# Patient Record
Sex: FEMALE | Race: WHITE | Employment: PART TIME | URBAN - METROPOLITAN AREA
[De-identification: names, ages, dates, MRNs, and addresses within clinical notes are randomized per-mention and may not be internally consistent; named-entity substitution may affect disease eponyms.]

---

## 2020-10-09 ENCOUNTER — HOSPITAL ENCOUNTER (OUTPATIENT)
Dept: ULTRASOUND IMAGING | Age: 50
Discharge: HOME | End: 2020-10-11
Payer: COMMERCIAL

## 2020-10-09 PROCEDURE — 76705 ECHO EXAM OF ABDOMEN: CPT

## 2023-06-21 ENCOUNTER — HOSPITAL ENCOUNTER (OUTPATIENT)
Dept: MRI IMAGING | Age: 53
Discharge: HOME | End: 2023-06-23
Attending: STUDENT IN AN ORGANIZED HEALTH CARE EDUCATION/TRAINING PROGRAM
Payer: COMMERCIAL

## 2023-06-21 DIAGNOSIS — M75.81 TENDINITIS OF RIGHT ROTATOR CUFF: ICD-10-CM

## 2023-06-21 PROCEDURE — 73221 MRI JOINT UPR EXTREM W/O DYE: CPT

## 2023-09-29 PROBLEM — L50.9 URTICARIA: Status: ACTIVE | Noted: 2023-09-29

## 2023-09-29 PROBLEM — M54.12 CERVICAL RADICULOPATHY: Status: ACTIVE | Noted: 2023-09-29

## 2023-09-29 PROBLEM — M75.80 ROTATOR CUFF TENDINITIS: Status: ACTIVE | Noted: 2023-09-29

## 2023-09-29 PROBLEM — M17.9 KNEE OSTEOARTHRITIS: Status: ACTIVE | Noted: 2023-09-29

## 2023-09-29 PROBLEM — T78.3XXA ANGIOEDEMA: Status: ACTIVE | Noted: 2023-09-29

## 2023-09-29 PROBLEM — E55.9 VITAMIN D INSUFFICIENCY: Status: ACTIVE | Noted: 2023-09-29

## 2023-09-29 PROBLEM — T78.1XXA ADVERSE FOOD REACTION: Status: ACTIVE | Noted: 2023-09-29

## 2023-09-29 PROBLEM — H60.60 CHRONIC OTITIS EXTERNA: Status: ACTIVE | Noted: 2023-09-29

## 2023-09-29 RX ORDER — MELOXICAM 15 MG/1
1 TABLET ORAL DAILY PRN
COMMUNITY
Start: 2021-09-28 | End: 2024-03-01 | Stop reason: HOSPADM

## 2023-09-29 RX ORDER — MOMETASONE FUROATE 1 MG/G
CREAM TOPICAL 2 TIMES DAILY
COMMUNITY
Start: 2019-11-25

## 2023-09-29 RX ORDER — ASPIRIN 325 MG
1 TABLET, DELAYED RELEASE (ENTERIC COATED) ORAL
COMMUNITY
Start: 2021-12-16

## 2023-09-29 RX ORDER — CETIRIZINE HYDROCHLORIDE 10 MG/1
2 TABLET ORAL EVERY 12 HOURS PRN
COMMUNITY
Start: 2021-12-10

## 2023-09-29 RX ORDER — NAPROXEN 500 MG/1
500 TABLET ORAL EVERY 12 HOURS PRN
COMMUNITY
End: 2024-03-01 | Stop reason: HOSPADM

## 2023-09-29 RX ORDER — FLUTICASONE PROPIONATE 50 MCG
2 SPRAY, SUSPENSION (ML) NASAL DAILY
COMMUNITY
Start: 2019-11-25

## 2023-10-06 ENCOUNTER — OFFICE VISIT (OUTPATIENT)
Dept: ORTHOPEDIC SURGERY | Facility: CLINIC | Age: 53
End: 2023-10-06
Payer: COMMERCIAL

## 2023-10-06 VITALS — BODY MASS INDEX: 39.87 KG/M2 | WEIGHT: 225 LBS | HEIGHT: 63 IN

## 2023-10-06 DIAGNOSIS — M54.12 CERVICAL RADICULOPATHY: Primary | ICD-10-CM

## 2023-10-06 DIAGNOSIS — M50.321 OTHER CERVICAL DISC DEGENERATION AT C4-C5 LEVEL: Primary | ICD-10-CM

## 2023-10-06 PROCEDURE — 99215 OFFICE O/P EST HI 40 MIN: CPT | Performed by: ORTHOPAEDIC SURGERY

## 2023-10-06 PROCEDURE — 1036F TOBACCO NON-USER: CPT | Performed by: ORTHOPAEDIC SURGERY

## 2023-10-06 NOTE — PROGRESS NOTES
Marla Nash is a 52 y.o. female who presents for Pain of the Neck (Severe DDD c4-5, 5-6, herniated discs at both levels/Seen by GG).    HPI:  52-year-old female here for evaluation of neck pain but mostly right shoulder and right arm pain.  She is having some numbness and tingling down into the right hand.  Not a whole lot going on on the left hand.  She is sent over by Amrik Correia She denies any fever chills nausea vomiting night sweats.  She has no bowel or bladder complaints.    Physical exam:  Well-nourished, well kept.  No lymphangitis or lymphadenopathy in the examined extremities.    Good perfusion to the extremities X 4. Radial and Dorsalis Pedis pulses 2+.  Capillary refill to all four digits brisk.  No distal edema.  Patient can rise from a seated position, can sit from a standing position. Can get up heels and toes.  Examination of the neck reveals no swelling, step off, or point tenderness.  Range of motion with flexion, extension, side bending and rotation is well maintained without crepitance, instability, or exacerbation of pain.  Strength is within normal limits.  Examination of the upper extremities reveals no point tenderness, swelling, or deformity.  Range of motion of the shoulders, elbows, wrists, and fingers are full without crepitance, instability, or exacerbation of pain. Strength is 5/5 throughout.  Except I get a mild positive empty can and Johnson sign on the right.  No redness, abrasions, or lesions on the upper extremities bilaterally.  Gross sensation intact to the extremities X 4.  Deep tendon reflexes 2+ and symmetric bilaterally.  Monroe, clonus, and Babinski were negative.  Straight leg raise negative.  Affect normal.  Alert and oriented X 3.  Coordination normal.    Assessment:  52-year-old female sent over by Amrik, she has got a several month history of mild right-sided neck pain and some right trapezius pain.  She has pain going down into the right arm and shoulder, does not  usually go into the forearm.  She describes numbness and tingling out into her hand.  She saw Dr. Anthony and had a shot in her right shoulder that did not really give her much relief.  She has tried physical therapy without much help.  She has never had epidural injections or surgery on her neck.  I get a little bit of positive shoulder findings with some mild empty can and Johnson on the right.  She comes to us today with x-rays and MRI from Amrik.  He is recommending a two-level ACDF.    Plan:    For complete plan and/or surgical details, please refer to Dr. Koo's portion of this split dictation.      In a face-to-face encounter, I performed a history and physical examination, discussed pertinent diagnostic studies if indicated, and discussed diagnosis and management strategies with both the patient and the midlevel provider.  I reviewed the midlevel's note and agree with the documented findings and plan of care.    Severe right arm radiculopathy that been going on for several months.  Not getting any better.  Dr. Anthonywas wondering if this was more of a neck problem than a shoulder problem.  The pain goes down the right arm to the hand with numbness and tingling in the hand.  There is difficulty lifting and holding things and some clumsiness in the right side.  Not much on the left side.  No history of surgery.  She is failed physical therapy.  She has had no injections.    On exam I do get some impingement symptoms on the right but they are not that severe.  I do get a positive Spurling's back to the right and there is a little weakness of C5 and C6 on the right compared to left.    MRI was reviewed and report was read as well.  The MRI shows at C4-5 there is moderate central and moderate to severe bilateral foraminal stenosis from a broad-based disc bulge.  At C5-6 there is moderate to severe central and severe right and moderate to severe left foraminal stenosis.  X-rays show degenerative changes at  C4-5 and C5-6.    Assessment/plan: Right arm radiculopathy and mild shoulder impingement.  Patient has had an injection to her shoulder which gave her minimal to no relief.  This appears to me to be a much more of a radiculopathy than a shoulder problem.  However, there could be an underlying shoulder component.  I had a long discussion with the patient and explained to them that their options are 1) live with the symptoms and see how they evolve, 2) physical therapy, 3) pain management or 4) surgery.    Patient has failed living with it.  She is failed physical therapy.  She has no interest in injections.  She just wants to proceed with surgery.  All the risks, benefits, and potential complications for operative and nonoperative treatment were discussed at length the patient. The patient understands that surgery is elective.  The patient understands that I do not have to do surgery.  The patient understands that surgery comes with more risks than not doing surgery. Risks of operative intervention include, but are not limited to: Anesthesia complications, excessive blood loss, infection, damaged uninjured structures including, but not limited to: The dural sac, nerve roots, spinal cord, vertebral artery, trachea, esophagus, and carotid artery, blood clots and lack of improvement or worsening of symptoms.  These complications could result in death, permanent disability including paralysis, swallowing or speech difficulty, or need for reoperation.  The patient completely understood these risks and wished to proceed with operative intervention.    Were going to perform a C4-5 C5-6 anterior cervical impression and fusion.  She is not going to get the surgery until next year.  She will work on a weight loss program prior to that.  We gave her the name of a book and a pamphlet today to work on an anti-inflammatory diet.

## 2023-12-08 ENCOUNTER — ANCILLARY PROCEDURE (OUTPATIENT)
Dept: RADIOLOGY | Facility: CLINIC | Age: 53
End: 2023-12-08
Payer: COMMERCIAL

## 2023-12-08 ENCOUNTER — OFFICE VISIT (OUTPATIENT)
Dept: ORTHOPEDIC SURGERY | Facility: CLINIC | Age: 53
End: 2023-12-08
Payer: COMMERCIAL

## 2023-12-08 DIAGNOSIS — S83.92XA SPRAIN OF LEFT KNEE, INITIAL ENCOUNTER: ICD-10-CM

## 2023-12-08 DIAGNOSIS — M25.562 ACUTE PAIN OF LEFT KNEE: ICD-10-CM

## 2023-12-08 DIAGNOSIS — S83.282A ACUTE LATERAL MENISCUS TEAR OF LEFT KNEE, INITIAL ENCOUNTER: ICD-10-CM

## 2023-12-08 PROCEDURE — 73564 X-RAY EXAM KNEE 4 OR MORE: CPT | Mod: LEFT SIDE | Performed by: FAMILY MEDICINE

## 2023-12-08 PROCEDURE — 99214 OFFICE O/P EST MOD 30 MIN: CPT | Performed by: FAMILY MEDICINE

## 2023-12-08 PROCEDURE — 1036F TOBACCO NON-USER: CPT | Performed by: FAMILY MEDICINE

## 2023-12-08 PROCEDURE — 73564 X-RAY EXAM KNEE 4 OR MORE: CPT | Mod: LT

## 2023-12-08 RX ORDER — METHYLPREDNISOLONE 4 MG/1
TABLET ORAL
Qty: 21 TABLET | Refills: 0 | Status: SHIPPED | OUTPATIENT
Start: 2023-12-08 | End: 2024-03-01 | Stop reason: HOSPADM

## 2023-12-08 RX ORDER — TRAMADOL HYDROCHLORIDE 50 MG/1
50 TABLET ORAL EVERY 8 HOURS PRN
Qty: 20 TABLET | Refills: 0 | Status: SHIPPED | OUTPATIENT
Start: 2023-12-08 | End: 2023-12-15

## 2023-12-10 NOTE — PROGRESS NOTES
Acute Injury New Patient Visit    CC:   Chief Complaint   Patient presents with    Left Knee - Pain     Pain x 3 days  X rays  today       HPI: Marla is a 53 y.o.female who presents today with new complaints of pain and discomfort of the posterior lateral aspect of the left knee.  She denies any obvious injury or trauma.  Has a history of known osteoarthritis and is followed up with Dr. Yaniv White and Dr. Checo Anthony III in the past.  She presents here today for further evaluation.  She has limited range of motion decreased strength has been utilizing a hinged knee brace and after 3 days felt that the pain will get better but it has not presents here for repeat evaluation.        Review of Systems   GENERAL: Negative for malaise, significant weight loss, fever  MUSCULOSKELETAL: See HPI  NEURO: Negative for numbness / tingling     Past Medical History  Past Medical History:   Diagnosis Date    Anesthesia of skin 09/28/2021    Numbness and tingling    Disease of digestive system, unspecified 09/28/2021    Digestive problems    Other general symptoms and signs 09/28/2021    Eye, ear, nose, and throat symptom    Personal history of other diseases of the musculoskeletal system and connective tissue 09/28/2021    History of arthritis    Personal history of other endocrine, nutritional and metabolic disease 09/28/2021    History of diabetes mellitus       Medication review  Medication Documentation Review Audit       Reviewed by Marga Joshi CMA (Medical Assistant) on 10/06/23 at 1442      Medication Order Taking? Sig Documenting Provider Last Dose Status   cetirizine (ZyrTEC) 10 mg tablet [id number]  Take 2 tablets (20 mg) by mouth every 12 hours if needed. Historical Provider, MD  Active   cholecalciferol (Vitamin D-3) 1,250 mcg (50,000 unit) capsule [id number]  Take 1 capsule (50,000 Units) by mouth 1 (one) time per week. Historical Provider, MD  Active   fluticasone (Flonase) 50 mcg/actuation nasal spray  [id number]  Administer 2 sprays into each nostril once daily. Historical Provider, MD  Active   meloxicam (Mobic) 15 mg tablet [id number]  Take 1 tablet (15 mg) by mouth once daily as needed. Historical Provider, MD  Active   mometasone (Elocon) 0.1 % cream [id number]  2 times a day. Apply to ear Historical Provider, MD  Active   naproxen (Naprosyn) 500 mg tablet [id number]  Take 1 tablet (500 mg) by mouth every 12 hours if needed for mild pain (1 - 3). Historical Provider, MD  Active                    Allergies  Allergies   Allergen Reactions    Cat Dander Unknown       Social History  Social History     Socioeconomic History    Marital status:      Spouse name: Not on file    Number of children: Not on file    Years of education: Not on file    Highest education level: Not on file   Occupational History    Not on file   Tobacco Use    Smoking status: Never    Smokeless tobacco: Never   Substance and Sexual Activity    Alcohol use: Not on file    Drug use: Never    Sexual activity: Not on file   Other Topics Concern    Not on file   Social History Narrative    Not on file     Social Determinants of Health     Financial Resource Strain: Not on file   Food Insecurity: Not on file   Transportation Needs: Not on file   Physical Activity: Not on file   Stress: Not on file   Social Connections: Not on file   Intimate Partner Violence: Not on file   Housing Stability: Not on file       Surgical History  Past Surgical History:   Procedure Laterality Date    OTHER SURGICAL HISTORY  09/28/2021    Knee arthroscopy       Physical Exam:  GENERAL:  Patient is awake, alert, and oriented to person place and time.  Patient appears well nourished and well kept.  Affect Calm, Not Acutely Distressed.  HEENT:  Normocephalic, Atraumatic, EOMI  CARDIOVASCULAR:  Hemodynamically stable.  RESPIRATORY:  Normal respirations with unlabored breathing.  NEURO: Gross sensation intact to the lower extremities bilaterally.  Extremity: Left knee  exam the affected knee was examined and inspected and was tender to the touch along the medial and lateral aspect with catching, locking or mechanical symptoms. The skin was intact without breakdown or open wound. Old incisions if present were healed. There was a mild Aicha exam seen with some evidence of instability & weakness in the collateral ligaments with varus/valgus stress & laxity in the anterior or posterior planes. There was a negative Lachman´s test, pivot shift test and posterior drawer sign with no foot drop, numbness or tingling. Sensation, reflexes and pulses in the foot and ankle are preserved. There was an effusion. Range of motion showed good straight leg raise with flexion to 75 degrees and extension to 0 degrees. The patient had the ability to bear weight, but with discomfort. The patient´s gait was antalgic secondary to the discomfort.      Diagnostics: X-rays today demonstrate no obvious fracture or dislocation with mild to moderate tricompartment arthritis worse medially.        Procedure: None  Procedures    Assessment:   Problem List Items Addressed This Visit    None  Visit Diagnoses       Acute pain of left knee        Relevant Orders    XR knee left 4+ views    Acute lateral meniscus tear of left knee, initial encounter        Relevant Medications    traMADol (Ultram) 50 mg tablet    methylPREDNISolone (Medrol Dospak) 4 mg tablets    Other Relevant Orders    MR knee left wo IV contrast    Sprain of left knee, initial encounter        Relevant Medications    traMADol (Ultram) 50 mg tablet    methylPREDNISolone (Medrol Dospak) 4 mg tablets    Other Relevant Orders    MR knee left wo IV contrast             Plan: At this time we will offer the patient a pain medication, OARRS was checked and okay she will also be provided with a steroid pack she will continue with a hinged knee brace icing and we will obtain an MRI for further evaluation due to concern for internal derangement of the left  knee.  She will follow-up with Dr. Checo Anthony III going forward.  Orders Placed This Encounter    XR knee left 4+ views    MR knee left wo IV contrast    traMADol (Ultram) 50 mg tablet    methylPREDNISolone (Medrol Dospak) 4 mg tablets      At the conclusion of the visit there were no further questions by the patient/family regarding their plan of care.  Patient was instructed to call or return with any issues, questions, or concerns regarding their injury and/or treatment plan described above.     12/10/23 at 11:15 AM - Cole C Budinsky, MD    Office: [phone]    This note was prepared using voice recognition software.  The details of this note are correct and have been reviewed, and corrected to the best of my ability.  Some grammatical errors may persist related to the Dragon software.

## 2023-12-21 ENCOUNTER — HOSPITAL ENCOUNTER (OUTPATIENT)
Dept: RADIOLOGY | Facility: HOSPITAL | Age: 53
Discharge: HOME | End: 2023-12-21
Payer: COMMERCIAL

## 2023-12-21 DIAGNOSIS — S83.92XA SPRAIN OF LEFT KNEE, INITIAL ENCOUNTER: ICD-10-CM

## 2023-12-21 DIAGNOSIS — S83.282A ACUTE LATERAL MENISCUS TEAR OF LEFT KNEE, INITIAL ENCOUNTER: ICD-10-CM

## 2023-12-21 PROCEDURE — 73721 MRI JNT OF LWR EXTRE W/O DYE: CPT | Mod: LT

## 2023-12-21 PROCEDURE — 73721 MRI JNT OF LWR EXTRE W/O DYE: CPT | Mod: LEFT SIDE | Performed by: RADIOLOGY

## 2023-12-29 ENCOUNTER — OFFICE VISIT (OUTPATIENT)
Dept: ORTHOPEDIC SURGERY | Facility: CLINIC | Age: 53
End: 2023-12-29
Payer: COMMERCIAL

## 2023-12-29 VITALS — BODY MASS INDEX: 39.87 KG/M2 | HEIGHT: 63 IN | WEIGHT: 225 LBS

## 2023-12-29 DIAGNOSIS — S83.282A ACUTE LATERAL MENISCUS TEAR OF LEFT KNEE, INITIAL ENCOUNTER: Primary | ICD-10-CM

## 2023-12-29 PROCEDURE — 99214 OFFICE O/P EST MOD 30 MIN: CPT | Performed by: STUDENT IN AN ORGANIZED HEALTH CARE EDUCATION/TRAINING PROGRAM

## 2023-12-29 PROCEDURE — 1036F TOBACCO NON-USER: CPT | Performed by: STUDENT IN AN ORGANIZED HEALTH CARE EDUCATION/TRAINING PROGRAM

## 2023-12-29 ASSESSMENT — PAIN SCALES - GENERAL: PAINLEVEL_OUTOF10: 3

## 2023-12-29 ASSESSMENT — PAIN - FUNCTIONAL ASSESSMENT: PAIN_FUNCTIONAL_ASSESSMENT: 0-10

## 2023-12-30 RX ORDER — DICLOFENAC SODIUM 10 MG/G
4 GEL TOPICAL 2 TIMES DAILY
Qty: 100 G | Refills: 0 | Status: SHIPPED | OUTPATIENT
Start: 2023-12-30 | End: 2024-03-01 | Stop reason: HOSPADM

## 2023-12-30 NOTE — PROGRESS NOTES
Chief Complaint   Patient presents with    Left Knee - New Patient Visit     Acute lateral meniscus tear   DOI  - on going pain  Mri 12/21/23       HPI  Patient presents today for follow up of left knee MRI results.  Patient has been having ongoing pain to the knee which is quite bothersome.  Endorses clicking catching popping is difficult time keeping her knee straight due to pain and discomfort.  Has already attempted intra-articular injections, activity modifications, bracing with little to no relief.  Did have a diagnostic arthroscopy by my partner Dr. White several years ago.  This did provide her significant relief for several years time.  Presents today for further evaluation and treatment.       Physical exam  General: Alert and oriented to place, person, and time.  No acute distress and breathing comfortably; pleasant and cooperative with the examination.  Extremity:  Left knee: Some crepitus with range of motion of the knee  Skin healthy and intact  No gross swelling or ecchymosis  No significant varus or valgus malalignment  Effusion: Mild     ROM:  Full flexion   Full extension  No pain with internal rotation of the hip  Tenderness to palpation: Medial lateral joint line     No laxity to valgus stress  No laxity to varus stress  Negative Lachman´s test  Negative anterior drawer test  Negative posterior drawer test  Positive Aicha´s test     Neurovascular exam normal distally    Diagnostics:  MR knee left wo IV contrast    Result Date: 12/21/2023  Interpreted By:  Ankur Moreau, STUDY: MR KNEE LEFT WO IV CONTRAST;  12/21/2023 11:09 am   INDICATION: Signs/Symptoms:Pain. Left knee pain posterolaterally. Limited range of motion.   COMPARISON: MRI examination of 09/19/2019   ACCESSION NUMBER(S): [id number]   ORDERING CLINICIAN: COLE BUDINSKY   TECHNIQUE: Multiplanar and multisequential MR images of the left knee are performed.   FINDINGS: There is a small joint effusion. There is a  moderate to large sized Baker's cyst with multiple internal septations.   There are findings of tricompartmental osteoarthritis which are severe in the medial compartment. At the medial compartment there is full-thickness loss of the articular cartilage with subchondral degenerative signal and cystic changes. Large marginal osteophytes are identified at multiple sites. There is peripheral displacement of the medial meniscal body. There is diffuse thinning of the patellar articular cartilage with full-thickness fissuring of the cartilage at the medial and lateral facet as well as the patellar apex. There is mild irregular thinning of the articular cartilage at the medial compartment with mild joint space narrowing. There is no evidence of acute osseous fracture, bone marrow edema, or osseous mass. There is no loose osteochondral lesion.   The lateral meniscus is of normal morphology. There is no linear tear or truncation.   The medial meniscus is abnormal. The posterior horn and body demonstrate contour irregularity more pronounced towards the free edge. There is complex signal throughout the posterior horn and body which extends to the superior and inferior articular surfaces. The anterior horn is unremarkable aside from some blunting of the free edge.   The anterior and posterior cruciate ligaments, lateral collateral ligament complex, popliteus tendon, medial collateral ligament, extensor complex, and patellar retinacula are intact.       Tricompartmental osteoarthritis, severe in the medial compartment. There is superimposed complex tearing of the posterior horn and body of the medial meniscus as detailed above.   Small joint effusion and moderate to large sized Baker's cyst with internal septations.   No sign of acute ligament disruption.   Signed by: Ankur Moreau 12/21/2023 1:54 PM Dictation workstation:   [id number]    XR knee left 4+ views    Result Date: 12/10/2023  Interpreted By:  Budinsky, Cole,  STUDY: XR KNEE LEFT 4+ VIEWS;  ;  12/8/2023 4:14 pm   INDICATION: Signs/Symptoms:pain.   ACCESSION NUMBER(S): [id number]   ORDERING CLINICIAN: COLE BUDINSKY   FINDINGS: Four views left knee demonstrate no presence for obvious acute fracture or dislocation. Presence of mild-to-moderate osteoarthritic changes with medial joint space narrowing and bone spurring. Preserved lateral joint space and subtle lateral patellar tilt. No presence for obvious loose or intra-articular body. Question small suprapatellar joint effusion.     Signed by: Cole Budinsky 12/10/2023 11:14 AM Dictation workstation:   [id number]       Procedures  Procedures     Assessment:  53-year-old female with severe medial compartment knee arthritis, prior history of partial medial meniscectomy    Treatment plan:  Will proceed with prescription for Voltaren cream  Patient has not attempted gel injections and is not interested in any type of surgical intervention at this point time  We will see how she does with gel injection therapy  She does have upcoming surgery with my partner for her  cervical spine  Discussed activities to avoid  Discussed importance of low impact activities and exercises  We discussed the use of nonsteroidal anti-inflammatory drugs as part of a treatment plan. We discussed that these may result in GI upset and/or bleeding. Any stomach pain or dark hard stools would be reason to discontinue use. We discussed that when used over the long-term these medications can result in altered renal or hepatic function, and that routine use beyond 3 months requires follow-up with their primary provider for monitoring.  They expressed their understanding and agree with the plan.    Will submit preop certification for gel injections  All of the patient's questions concerns answered

## 2024-01-05 DIAGNOSIS — Z01.818 PREOP TESTING: Primary | ICD-10-CM

## 2024-02-09 ENCOUNTER — APPOINTMENT (OUTPATIENT)
Dept: ORTHOPEDIC SURGERY | Facility: CLINIC | Age: 54
End: 2024-02-09
Payer: COMMERCIAL

## 2024-02-19 ENCOUNTER — HOSPITAL ENCOUNTER (OUTPATIENT)
Dept: CARDIOLOGY | Facility: HOSPITAL | Age: 54
Discharge: HOME | End: 2024-02-19
Payer: COMMERCIAL

## 2024-02-19 ENCOUNTER — PRE-ADMISSION TESTING (OUTPATIENT)
Dept: PREADMISSION TESTING | Facility: HOSPITAL | Age: 54
End: 2024-02-19
Payer: COMMERCIAL

## 2024-02-19 VITALS
WEIGHT: 235.89 LBS | DIASTOLIC BLOOD PRESSURE: 82 MMHG | SYSTOLIC BLOOD PRESSURE: 138 MMHG | OXYGEN SATURATION: 98 % | HEIGHT: 63 IN | BODY MASS INDEX: 41.8 KG/M2 | HEART RATE: 79 BPM

## 2024-02-19 DIAGNOSIS — Z01.818 PREOP TESTING: Primary | ICD-10-CM

## 2024-02-19 DIAGNOSIS — Z01.818 PREOP TESTING: ICD-10-CM

## 2024-02-19 LAB
ALBUMIN SERPL BCP-MCNC: 4.5 G/DL (ref 3.4–5)
ALP SERPL-CCNC: 82 U/L (ref 33–110)
ALT SERPL W P-5'-P-CCNC: 42 U/L (ref 7–45)
ANION GAP SERPL CALC-SCNC: 10 MMOL/L (ref 10–20)
APPEARANCE UR: CLEAR
AST SERPL W P-5'-P-CCNC: 26 U/L (ref 9–39)
BASOPHILS # BLD AUTO: 0.09 X10*3/UL (ref 0–0.1)
BASOPHILS NFR BLD AUTO: 1.2 %
BILIRUB SERPL-MCNC: 0.5 MG/DL (ref 0–1.2)
BILIRUB UR STRIP.AUTO-MCNC: NEGATIVE MG/DL
BUN SERPL-MCNC: 14 MG/DL (ref 6–23)
CALCIUM SERPL-MCNC: 9.9 MG/DL (ref 8.6–10.3)
CHLORIDE SERPL-SCNC: 102 MMOL/L (ref 98–107)
CO2 SERPL-SCNC: 30 MMOL/L (ref 21–32)
COLOR UR: YELLOW
CREAT SERPL-MCNC: 0.8 MG/DL (ref 0.5–1.05)
EGFRCR SERPLBLD CKD-EPI 2021: 88 ML/MIN/1.73M*2
EOSINOPHIL # BLD AUTO: 0.47 X10*3/UL (ref 0–0.7)
EOSINOPHIL NFR BLD AUTO: 6.5 %
ERYTHROCYTE [DISTWIDTH] IN BLOOD BY AUTOMATED COUNT: 13.7 % (ref 11.5–14.5)
EST. AVERAGE GLUCOSE BLD GHB EST-MCNC: 146 MG/DL
GLUCOSE SERPL-MCNC: 115 MG/DL (ref 74–99)
GLUCOSE UR STRIP.AUTO-MCNC: NEGATIVE MG/DL
HBA1C MFR BLD: 6.7 %
HCT VFR BLD AUTO: 38.6 % (ref 36–46)
HGB BLD-MCNC: 12.4 G/DL (ref 12–16)
HOLD SPECIMEN: NORMAL
IMM GRANULOCYTES # BLD AUTO: 0.01 X10*3/UL (ref 0–0.7)
IMM GRANULOCYTES NFR BLD AUTO: 0.1 % (ref 0–0.9)
INR PPP: 1 (ref 0.9–1.1)
KETONES UR STRIP.AUTO-MCNC: NEGATIVE MG/DL
LEUKOCYTE ESTERASE UR QL STRIP.AUTO: NEGATIVE
LYMPHOCYTES # BLD AUTO: 3.09 X10*3/UL (ref 1.2–4.8)
LYMPHOCYTES NFR BLD AUTO: 42.7 %
MCH RBC QN AUTO: 28.3 PG (ref 26–34)
MCHC RBC AUTO-ENTMCNC: 32.1 G/DL (ref 32–36)
MCV RBC AUTO: 88 FL (ref 80–100)
MONOCYTES # BLD AUTO: 0.56 X10*3/UL (ref 0.1–1)
MONOCYTES NFR BLD AUTO: 7.7 %
NEUTROPHILS # BLD AUTO: 3.02 X10*3/UL (ref 1.2–7.7)
NEUTROPHILS NFR BLD AUTO: 41.8 %
NITRITE UR QL STRIP.AUTO: NEGATIVE
NRBC BLD-RTO: 0 /100 WBCS (ref 0–0)
PH UR STRIP.AUTO: 6 [PH]
PLATELET # BLD AUTO: 328 X10*3/UL (ref 150–450)
POTASSIUM SERPL-SCNC: 4.4 MMOL/L (ref 3.5–5.3)
PROT SERPL-MCNC: 7.2 G/DL (ref 6.4–8.2)
PROT UR STRIP.AUTO-MCNC: NEGATIVE MG/DL
PROTHROMBIN TIME: 11 SECONDS (ref 9.8–12.8)
RBC # BLD AUTO: 4.38 X10*6/UL (ref 4–5.2)
RBC # UR STRIP.AUTO: NEGATIVE /UL
SODIUM SERPL-SCNC: 138 MMOL/L (ref 136–145)
SP GR UR STRIP.AUTO: 1.02
UROBILINOGEN UR STRIP.AUTO-MCNC: <2 MG/DL
WBC # BLD AUTO: 7.2 X10*3/UL (ref 4.4–11.3)

## 2024-02-19 PROCEDURE — 81003 URINALYSIS AUTO W/O SCOPE: CPT

## 2024-02-19 PROCEDURE — 85610 PROTHROMBIN TIME: CPT

## 2024-02-19 PROCEDURE — 93005 ELECTROCARDIOGRAM TRACING: CPT

## 2024-02-19 PROCEDURE — 87081 CULTURE SCREEN ONLY: CPT | Mod: ELYLAB

## 2024-02-19 PROCEDURE — 93010 ELECTROCARDIOGRAM REPORT: CPT | Performed by: INTERNAL MEDICINE

## 2024-02-19 PROCEDURE — 36415 COLL VENOUS BLD VENIPUNCTURE: CPT

## 2024-02-19 PROCEDURE — 83036 HEMOGLOBIN GLYCOSYLATED A1C: CPT | Mod: ELYLAB

## 2024-02-19 PROCEDURE — 85025 COMPLETE CBC W/AUTO DIFF WBC: CPT

## 2024-02-19 PROCEDURE — 84075 ASSAY ALKALINE PHOSPHATASE: CPT

## 2024-02-19 RX ORDER — CHOLECALCIFEROL (VITAMIN D3) 25 MCG
1000 TABLET ORAL DAILY
COMMUNITY

## 2024-02-19 NOTE — PREPROCEDURE INSTRUCTIONS
Patient and nurse discussed pre-operative instructions of the location of procedure, NPO status, home medications, and what to aspect after procedure.  Patient is aware to not smoke nicotine products, drink alcohol, or do any drugs within 24hrs of procedure.  Not to bring any valuables and to not wear any metal, jewelry, piercing's or beauty products for surgery.  Patient received the  instructional handout on how to perform the CHG wipes the night before and the morning of surgery.  Informed about the call the business day prior to procedure that will be give the exact time of arrival on the day of surgery, between 2PM-4PM.  Any questions call the surgeons office, and any questions about Pre-Admission Testing call [phone]

## 2024-02-21 ENCOUNTER — TELEPHONE (OUTPATIENT)
Dept: ORTHOPEDIC SURGERY | Facility: CLINIC | Age: 54
End: 2024-02-21
Payer: COMMERCIAL

## 2024-02-21 NOTE — TELEPHONE ENCOUNTER
Patient is requesting a call , has questions about a med for before surgery.  She can be reached at  [phone]

## 2024-02-22 LAB — STAPHYLOCOCCUS SPEC CULT: NORMAL

## 2024-02-27 ENCOUNTER — TELEPHONE (OUTPATIENT)
Dept: ORTHOPEDIC SURGERY | Facility: CLINIC | Age: 54
End: 2024-02-27

## 2024-02-27 ENCOUNTER — OFFICE VISIT (OUTPATIENT)
Dept: ORTHOPEDIC SURGERY | Facility: CLINIC | Age: 54
End: 2024-02-27
Payer: COMMERCIAL

## 2024-02-27 DIAGNOSIS — M54.12 CERVICAL RADICULOPATHY: Primary | ICD-10-CM

## 2024-02-27 LAB
ATRIAL RATE: 77 BPM
P AXIS: 7 DEGREES
P OFFSET: 211 MS
P ONSET: 158 MS
PR INTERVAL: 134 MS
Q ONSET: 225 MS
QRS COUNT: 13 BEATS
QRS DURATION: 80 MS
QT INTERVAL: 374 MS
QTC CALCULATION(BAZETT): 423 MS
QTC FREDERICIA: 406 MS
R AXIS: 19 DEGREES
T AXIS: 8 DEGREES
T OFFSET: 412 MS
VENTRICULAR RATE: 77 BPM

## 2024-02-27 PROCEDURE — 1036F TOBACCO NON-USER: CPT | Performed by: ORTHOPAEDIC SURGERY

## 2024-02-27 PROCEDURE — L0180 CER POST COL OCC/MAN SUP ADJ: HCPCS | Performed by: ORTHOPAEDIC SURGERY

## 2024-02-27 PROCEDURE — 99024 POSTOP FOLLOW-UP VISIT: CPT | Performed by: ORTHOPAEDIC SURGERY

## 2024-02-27 NOTE — PROGRESS NOTES
Marla Nash is a 53 y.o. female who presents for Pre-op Visit of the Neck (C4-5, C5-6 ACDF for 3/1/24 at [facility]  ).    HPI:  53-year-old female is here for preop visit.  She is scheduled undergo a C4-5 C5-6 ACDF.  She denies any fever chills nausea vomiting night sweats.  She has no bowel or bladder complaints.    Physical exam:  Well-nourished, well kept.   Patient can rise from a seated position, can sit from a standing position. Can get up heels and toes.  Patient is tender in the paraspinal musculature of the cervical spine is range of motion is mildly decreased secondary to some pain and stiffness no weakness no instability to muscle strength. examination of the upper extremities reveals no point tenderness, swelling, or deformity.  Range of motion of the shoulders, elbows, wrists, and fingers are full without crepitance, instability, or exacerbation of pain. Strength is 5/5 throughout. no redness, abrasions, or lesions on the upper extremities bilaterally.  Gross sensation intact to the extremities.  Deep tendon reflexes 1+ and symmetric bilaterally.  Monroe negative.  Affect normal.  Alert and oriented X 3.  Coordination normal.    Assessment:  53-year-old female here for preop visit.  She is scheduled undergo a C4-5, C5-6 ACDF.  Pre and postoperative information instructions were given to the patient.  The brace was fitted today, brace instructions were given.  Lifting twisting bending restrictions were discussed.  Risk and benefits were discussed with Dr. Koo on October 6, 2023.  All questions were answered.  Patient is ready to proceed with surgery.    Plan:  We will see her back in 2 weeks after her surgery.  This is a two-level ACDF.  We will get AP lateral plain films at that time.

## 2024-03-01 ENCOUNTER — ANESTHESIA (OUTPATIENT)
Dept: OPERATING ROOM | Facility: HOSPITAL | Age: 54
End: 2024-03-01
Payer: COMMERCIAL

## 2024-03-01 ENCOUNTER — ANESTHESIA EVENT (OUTPATIENT)
Dept: OPERATING ROOM | Facility: HOSPITAL | Age: 54
End: 2024-03-01
Payer: COMMERCIAL

## 2024-03-01 ENCOUNTER — HOSPITAL ENCOUNTER (OUTPATIENT)
Facility: HOSPITAL | Age: 54
Setting detail: OUTPATIENT SURGERY
Discharge: HOME | End: 2024-03-01
Attending: ORTHOPAEDIC SURGERY | Admitting: ORTHOPAEDIC SURGERY
Payer: COMMERCIAL

## 2024-03-01 ENCOUNTER — APPOINTMENT (OUTPATIENT)
Dept: RADIOLOGY | Facility: HOSPITAL | Age: 54
End: 2024-03-01
Payer: COMMERCIAL

## 2024-03-01 VITALS
SYSTOLIC BLOOD PRESSURE: 147 MMHG | BODY MASS INDEX: 41.09 KG/M2 | TEMPERATURE: 96.8 F | DIASTOLIC BLOOD PRESSURE: 82 MMHG | RESPIRATION RATE: 18 BRPM | HEIGHT: 63 IN | HEART RATE: 75 BPM | OXYGEN SATURATION: 95 % | WEIGHT: 231.92 LBS

## 2024-03-01 DIAGNOSIS — M50.321 OTHER CERVICAL DISC DEGENERATION AT C4-C5 LEVEL: Primary | ICD-10-CM

## 2024-03-01 LAB
GLUCOSE BLD MANUAL STRIP-MCNC: 134 MG/DL (ref 74–99)
GLUCOSE BLD MANUAL STRIP-MCNC: 144 MG/DL (ref 74–99)

## 2024-03-01 PROCEDURE — 22845 INSERT SPINE FIXATION DEVICE: CPT | Performed by: ORTHOPAEDIC SURGERY

## 2024-03-01 PROCEDURE — 2780000003 HC OR 278 NO HCPCS: Performed by: ORTHOPAEDIC SURGERY

## 2024-03-01 PROCEDURE — 2500000005 HC RX 250 GENERAL PHARMACY W/O HCPCS: Performed by: PHYSICIAN ASSISTANT

## 2024-03-01 PROCEDURE — 2500000002 HC RX 250 W HCPCS SELF ADMINISTERED DRUGS (ALT 637 FOR MEDICARE OP, ALT 636 FOR OP/ED): Performed by: PHYSICIAN ASSISTANT

## 2024-03-01 PROCEDURE — 7100000001 HC RECOVERY ROOM TIME - INITIAL BASE CHARGE: Performed by: ORTHOPAEDIC SURGERY

## 2024-03-01 PROCEDURE — 3600000003 HC OR TIME - INITIAL BASE CHARGE - PROCEDURE LEVEL THREE: Performed by: ORTHOPAEDIC SURGERY

## 2024-03-01 PROCEDURE — 20931 SP BONE ALGRFT STRUCT ADD-ON: CPT | Performed by: ORTHOPAEDIC SURGERY

## 2024-03-01 PROCEDURE — 2500000004 HC RX 250 GENERAL PHARMACY W/ HCPCS (ALT 636 FOR OP/ED): Performed by: ANESTHESIOLOGY

## 2024-03-01 PROCEDURE — 2500000005 HC RX 250 GENERAL PHARMACY W/O HCPCS: Performed by: ANESTHESIOLOGY

## 2024-03-01 PROCEDURE — 22845 INSERT SPINE FIXATION DEVICE: CPT | Performed by: PHYSICIAN ASSISTANT

## 2024-03-01 PROCEDURE — 2500000004 HC RX 250 GENERAL PHARMACY W/ HCPCS (ALT 636 FOR OP/ED): Performed by: PHYSICIAN ASSISTANT

## 2024-03-01 PROCEDURE — A4217 STERILE WATER/SALINE, 500 ML: HCPCS | Performed by: ORTHOPAEDIC SURGERY

## 2024-03-01 PROCEDURE — 3700000001 HC GENERAL ANESTHESIA TIME - INITIAL BASE CHARGE: Performed by: ORTHOPAEDIC SURGERY

## 2024-03-01 PROCEDURE — 2500000001 HC RX 250 WO HCPCS SELF ADMINISTERED DRUGS (ALT 637 FOR MEDICARE OP): Performed by: PHYSICIAN ASSISTANT

## 2024-03-01 PROCEDURE — 82947 ASSAY GLUCOSE BLOOD QUANT: CPT

## 2024-03-01 PROCEDURE — 22551 ARTHRD ANT NTRBDY CERVICAL: CPT | Performed by: ORTHOPAEDIC SURGERY

## 2024-03-01 PROCEDURE — 22552 ARTHRD ANT NTRBD CERVICAL EA: CPT | Performed by: PHYSICIAN ASSISTANT

## 2024-03-01 PROCEDURE — C1713 ANCHOR/SCREW BN/BN,TIS/BN: HCPCS | Performed by: ORTHOPAEDIC SURGERY

## 2024-03-01 PROCEDURE — 3600000008 HC OR TIME - EACH INCREMENTAL 1 MINUTE - PROCEDURE LEVEL THREE: Performed by: ORTHOPAEDIC SURGERY

## 2024-03-01 PROCEDURE — 7100000010 HC PHASE TWO TIME - EACH INCREMENTAL 1 MINUTE: Performed by: ORTHOPAEDIC SURGERY

## 2024-03-01 PROCEDURE — 2500000004 HC RX 250 GENERAL PHARMACY W/ HCPCS (ALT 636 FOR OP/ED): Performed by: ORTHOPAEDIC SURGERY

## 2024-03-01 PROCEDURE — 22552 ARTHRD ANT NTRBD CERVICAL EA: CPT | Performed by: ORTHOPAEDIC SURGERY

## 2024-03-01 PROCEDURE — 7100000002 HC RECOVERY ROOM TIME - EACH INCREMENTAL 1 MINUTE: Performed by: ORTHOPAEDIC SURGERY

## 2024-03-01 PROCEDURE — 2720000007 HC OR 272 NO HCPCS: Performed by: ORTHOPAEDIC SURGERY

## 2024-03-01 PROCEDURE — 22551 ARTHRD ANT NTRBDY CERVICAL: CPT | Performed by: PHYSICIAN ASSISTANT

## 2024-03-01 PROCEDURE — 2500000005 HC RX 250 GENERAL PHARMACY W/O HCPCS: Performed by: ORTHOPAEDIC SURGERY

## 2024-03-01 PROCEDURE — 7100000009 HC PHASE TWO TIME - INITIAL BASE CHARGE: Performed by: ORTHOPAEDIC SURGERY

## 2024-03-01 PROCEDURE — 3700000002 HC GENERAL ANESTHESIA TIME - EACH INCREMENTAL 1 MINUTE: Performed by: ORTHOPAEDIC SURGERY

## 2024-03-01 DEVICE — SCREW, ACP, SELF DRILL, 3.5 X 15MM, VARIABLE: Type: IMPLANTABLE DEVICE | Site: NECK | Status: FUNCTIONAL

## 2024-03-01 DEVICE — IMPLANTABLE DEVICE: Type: IMPLANTABLE DEVICE | Site: SPINE CERVICAL | Status: FUNCTIONAL

## 2024-03-01 RX ORDER — FENTANYL CITRATE 50 UG/ML
25 INJECTION, SOLUTION INTRAMUSCULAR; INTRAVENOUS EVERY 5 MIN PRN
Status: DISCONTINUED | OUTPATIENT
Start: 2024-03-01 | End: 2024-03-01 | Stop reason: HOSPADM

## 2024-03-01 RX ORDER — ALBUTEROL SULFATE 0.83 MG/ML
2.5 SOLUTION RESPIRATORY (INHALATION) ONCE AS NEEDED
Status: DISCONTINUED | OUTPATIENT
Start: 2024-03-01 | End: 2024-03-01 | Stop reason: HOSPADM

## 2024-03-01 RX ORDER — PROPOFOL 10 MG/ML
INJECTION, EMULSION INTRAVENOUS AS NEEDED
Status: DISCONTINUED | OUTPATIENT
Start: 2024-03-01 | End: 2024-03-01

## 2024-03-01 RX ORDER — MIDAZOLAM HYDROCHLORIDE 1 MG/ML
INJECTION, SOLUTION INTRAMUSCULAR; INTRAVENOUS AS NEEDED
Status: DISCONTINUED | OUTPATIENT
Start: 2024-03-01 | End: 2024-03-01

## 2024-03-01 RX ORDER — SODIUM CHLORIDE, SODIUM LACTATE, POTASSIUM CHLORIDE, CALCIUM CHLORIDE 600; 310; 30; 20 MG/100ML; MG/100ML; MG/100ML; MG/100ML
100 INJECTION, SOLUTION INTRAVENOUS CONTINUOUS
Status: DISCONTINUED | OUTPATIENT
Start: 2024-03-01 | End: 2024-03-01 | Stop reason: HOSPADM

## 2024-03-01 RX ORDER — ACETAMINOPHEN 325 MG/1
650 TABLET ORAL EVERY 4 HOURS PRN
Status: DISCONTINUED | OUTPATIENT
Start: 2024-03-01 | End: 2024-03-01 | Stop reason: HOSPADM

## 2024-03-01 RX ORDER — PROPOFOL 10 MG/ML
INJECTION, EMULSION INTRAVENOUS CONTINUOUS PRN
Status: DISCONTINUED | OUTPATIENT
Start: 2024-03-01 | End: 2024-03-01

## 2024-03-01 RX ORDER — CELECOXIB 200 MG/1
200 CAPSULE ORAL ONCE
Status: COMPLETED | OUTPATIENT
Start: 2024-03-01 | End: 2024-03-01

## 2024-03-01 RX ORDER — LIDOCAINE HYDROCHLORIDE 10 MG/ML
0.1 INJECTION, SOLUTION EPIDURAL; INFILTRATION; INTRACAUDAL; PERINEURAL ONCE
Status: DISCONTINUED | OUTPATIENT
Start: 2024-03-01 | End: 2024-03-01 | Stop reason: HOSPADM

## 2024-03-01 RX ORDER — HYDROMORPHONE HYDROCHLORIDE 1 MG/ML
INJECTION, SOLUTION INTRAMUSCULAR; INTRAVENOUS; SUBCUTANEOUS AS NEEDED
Status: DISCONTINUED | OUTPATIENT
Start: 2024-03-01 | End: 2024-03-01

## 2024-03-01 RX ORDER — SCOLOPAMINE TRANSDERMAL SYSTEM 1 MG/1
PATCH, EXTENDED RELEASE TRANSDERMAL AS NEEDED
Status: DISCONTINUED | OUTPATIENT
Start: 2024-03-01 | End: 2024-03-01

## 2024-03-01 RX ORDER — ROCURONIUM BROMIDE 10 MG/ML
INJECTION, SOLUTION INTRAVENOUS AS NEEDED
Status: DISCONTINUED | OUTPATIENT
Start: 2024-03-01 | End: 2024-03-01

## 2024-03-01 RX ORDER — OXYCODONE HYDROCHLORIDE 5 MG/1
10 TABLET ORAL EVERY 4 HOURS PRN
Status: DISCONTINUED | OUTPATIENT
Start: 2024-03-01 | End: 2024-03-01 | Stop reason: HOSPADM

## 2024-03-01 RX ORDER — TRANEXAMIC ACID 650 MG/1
1950 TABLET ORAL ONCE
Status: COMPLETED | OUTPATIENT
Start: 2024-03-01 | End: 2024-03-01

## 2024-03-01 RX ORDER — CEFAZOLIN SODIUM 2 G/100ML
2 INJECTION, SOLUTION INTRAVENOUS ONCE
Status: COMPLETED | OUTPATIENT
Start: 2024-03-01 | End: 2024-03-01

## 2024-03-01 RX ORDER — DEXAMETHASONE SODIUM PHOSPHATE 4 MG/ML
INJECTION, SOLUTION INTRA-ARTICULAR; INTRALESIONAL; INTRAMUSCULAR; INTRAVENOUS; SOFT TISSUE AS NEEDED
Status: DISCONTINUED | OUTPATIENT
Start: 2024-03-01 | End: 2024-03-01

## 2024-03-01 RX ORDER — ONDANSETRON HYDROCHLORIDE 2 MG/ML
INJECTION, SOLUTION INTRAVENOUS AS NEEDED
Status: DISCONTINUED | OUTPATIENT
Start: 2024-03-01 | End: 2024-03-01

## 2024-03-01 RX ORDER — ONDANSETRON HYDROCHLORIDE 2 MG/ML
4 INJECTION, SOLUTION INTRAVENOUS ONCE AS NEEDED
Status: DISCONTINUED | OUTPATIENT
Start: 2024-03-01 | End: 2024-03-01 | Stop reason: HOSPADM

## 2024-03-01 RX ORDER — DIPHENHYDRAMINE HYDROCHLORIDE 50 MG/ML
12.5 INJECTION INTRAMUSCULAR; INTRAVENOUS ONCE AS NEEDED
Status: DISCONTINUED | OUTPATIENT
Start: 2024-03-01 | End: 2024-03-01 | Stop reason: HOSPADM

## 2024-03-01 RX ORDER — MEPERIDINE HYDROCHLORIDE 25 MG/ML
12.5 INJECTION INTRAMUSCULAR; INTRAVENOUS; SUBCUTANEOUS EVERY 10 MIN PRN
Status: DISCONTINUED | OUTPATIENT
Start: 2024-03-01 | End: 2024-03-01 | Stop reason: HOSPADM

## 2024-03-01 RX ORDER — SODIUM CHLORIDE, SODIUM LACTATE, POTASSIUM CHLORIDE, CALCIUM CHLORIDE 600; 310; 30; 20 MG/100ML; MG/100ML; MG/100ML; MG/100ML
100 INJECTION, SOLUTION INTRAVENOUS CONTINUOUS
Status: DISCONTINUED | OUTPATIENT
Start: 2024-03-02 | End: 2024-03-01 | Stop reason: HOSPADM

## 2024-03-01 RX ORDER — LIDOCAINE HYDROCHLORIDE 20 MG/ML
INJECTION, SOLUTION INFILTRATION; PERINEURAL AS NEEDED
Status: DISCONTINUED | OUTPATIENT
Start: 2024-03-01 | End: 2024-03-01

## 2024-03-01 RX ORDER — METOPROLOL TARTRATE 1 MG/ML
5 INJECTION, SOLUTION INTRAVENOUS ONCE
Status: DISCONTINUED | OUTPATIENT
Start: 2024-03-01 | End: 2024-03-01 | Stop reason: HOSPADM

## 2024-03-01 RX ORDER — GLYCOPYRROLATE 0.2 MG/ML
INJECTION INTRAMUSCULAR; INTRAVENOUS AS NEEDED
Status: DISCONTINUED | OUTPATIENT
Start: 2024-03-01 | End: 2024-03-01

## 2024-03-01 RX ORDER — FENTANYL CITRATE 50 UG/ML
INJECTION, SOLUTION INTRAMUSCULAR; INTRAVENOUS AS NEEDED
Status: DISCONTINUED | OUTPATIENT
Start: 2024-03-01 | End: 2024-03-01

## 2024-03-01 RX ORDER — OXYCODONE HYDROCHLORIDE 5 MG/1
5 TABLET ORAL EVERY 4 HOURS PRN
Status: DISCONTINUED | OUTPATIENT
Start: 2024-03-01 | End: 2024-03-01 | Stop reason: HOSPADM

## 2024-03-01 RX ORDER — EZETIMIBE 10 MG/1
10 TABLET ORAL DAILY
COMMUNITY

## 2024-03-01 RX ORDER — SODIUM CHLORIDE 0.9 G/100ML
IRRIGANT IRRIGATION AS NEEDED
Status: DISCONTINUED | OUTPATIENT
Start: 2024-03-01 | End: 2024-03-01 | Stop reason: HOSPADM

## 2024-03-01 RX ORDER — ACETAMINOPHEN 325 MG/1
650 TABLET ORAL ONCE
Status: COMPLETED | OUTPATIENT
Start: 2024-03-01 | End: 2024-03-01

## 2024-03-01 RX ORDER — OXYCODONE AND ACETAMINOPHEN 5; 325 MG/1; MG/1
1 TABLET ORAL EVERY 4 HOURS PRN
Qty: 25 TABLET | Refills: 0 | Status: SHIPPED | OUTPATIENT
Start: 2024-03-01 | End: 2024-03-06

## 2024-03-01 RX ADMIN — SODIUM CHLORIDE, POTASSIUM CHLORIDE, SODIUM LACTATE AND CALCIUM CHLORIDE: 600; 310; 30; 20 INJECTION, SOLUTION INTRAVENOUS at 07:25

## 2024-03-01 RX ADMIN — SUGAMMADEX 400 MG: 100 INJECTION, SOLUTION INTRAVENOUS at 09:58

## 2024-03-01 RX ADMIN — CELECOXIB 200 MG: 200 CAPSULE ORAL at 06:15

## 2024-03-01 RX ADMIN — POVIDONE-IODINE 1 APPLICATION: 5 SOLUTION TOPICAL at 06:15

## 2024-03-01 RX ADMIN — FENTANYL CITRATE 100 MCG: 50 INJECTION, SOLUTION INTRAMUSCULAR; INTRAVENOUS at 07:37

## 2024-03-01 RX ADMIN — ONDANSETRON 4 MG: 2 INJECTION, SOLUTION INTRAMUSCULAR; INTRAVENOUS at 07:25

## 2024-03-01 RX ADMIN — SCOPALAMINE 1 PATCH: 1 PATCH, EXTENDED RELEASE TRANSDERMAL at 07:15

## 2024-03-01 RX ADMIN — TRANEXAMIC ACID 1950 MG: 650 TABLET ORAL at 06:15

## 2024-03-01 RX ADMIN — ROCURONIUM BROMIDE 100 MG: 10 SOLUTION INTRAVENOUS at 07:37

## 2024-03-01 RX ADMIN — HYDROMORPHONE HYDROCHLORIDE 1 MG: 1 INJECTION, SOLUTION INTRAMUSCULAR; INTRAVENOUS; SUBCUTANEOUS at 08:01

## 2024-03-01 RX ADMIN — DEXAMETHASONE SODIUM PHOSPHATE 12 MG: 4 INJECTION, SOLUTION INTRAMUSCULAR; INTRAVENOUS at 07:37

## 2024-03-01 RX ADMIN — ACETAMINOPHEN 650 MG: 325 TABLET ORAL at 06:15

## 2024-03-01 RX ADMIN — LIDOCAINE HYDROCHLORIDE 60 MG: 20 INJECTION, SOLUTION INFILTRATION; PERINEURAL at 07:37

## 2024-03-01 RX ADMIN — MIDAZOLAM 2 MG: 1 INJECTION INTRAMUSCULAR; INTRAVENOUS at 07:25

## 2024-03-01 RX ADMIN — SODIUM CHLORIDE, POTASSIUM CHLORIDE, SODIUM LACTATE AND CALCIUM CHLORIDE 100 ML/HR: 600; 310; 30; 20 INJECTION, SOLUTION INTRAVENOUS at 06:16

## 2024-03-01 RX ADMIN — HYDROMORPHONE HYDROCHLORIDE 0.5 MG: 1 INJECTION, SOLUTION INTRAMUSCULAR; INTRAVENOUS; SUBCUTANEOUS at 11:00

## 2024-03-01 RX ADMIN — GLYCOPYRROLATE 0.2 MG: 0.2 INJECTION, SOLUTION INTRAMUSCULAR; INTRAVENOUS at 07:25

## 2024-03-01 RX ADMIN — PROPOFOL 50 MCG/KG/MIN: 10 INJECTION, EMULSION INTRAVENOUS at 07:41

## 2024-03-01 RX ADMIN — CEFAZOLIN SODIUM 2 G: 2 INJECTION, SOLUTION INTRAVENOUS at 07:53

## 2024-03-01 RX ADMIN — PROPOFOL 200 MG: 10 INJECTION, EMULSION INTRAVENOUS at 07:37

## 2024-03-01 SDOH — HEALTH STABILITY: MENTAL HEALTH: CURRENT SMOKER: 0

## 2024-03-01 ASSESSMENT — PAIN - FUNCTIONAL ASSESSMENT
PAIN_FUNCTIONAL_ASSESSMENT: 0-10

## 2024-03-01 ASSESSMENT — PAIN SCALES - GENERAL
PAINLEVEL_OUTOF10: 3
PAINLEVEL_OUTOF10: 0 - NO PAIN
PAINLEVEL_OUTOF10: 7
PAINLEVEL_OUTOF10: 0 - NO PAIN
PAINLEVEL_OUTOF10: 0 - NO PAIN
PAINLEVEL_OUTOF10: 2

## 2024-03-01 ASSESSMENT — COLUMBIA-SUICIDE SEVERITY RATING SCALE - C-SSRS
2. HAVE YOU ACTUALLY HAD ANY THOUGHTS OF KILLING YOURSELF?: NO
1. IN THE PAST MONTH, HAVE YOU WISHED YOU WERE DEAD OR WISHED YOU COULD GO TO SLEEP AND NOT WAKE UP?: NO
6. HAVE YOU EVER DONE ANYTHING, STARTED TO DO ANYTHING, OR PREPARED TO DO ANYTHING TO END YOUR LIFE?: NO

## 2024-03-01 ASSESSMENT — PAIN DESCRIPTION - LOCATION: LOCATION: NECK

## 2024-03-01 ASSESSMENT — PAIN DESCRIPTION - ORIENTATION: ORIENTATION: ANTERIOR

## 2024-03-01 NOTE — ANESTHESIA POSTPROCEDURE EVALUATION
Patient: Marla Nash    Procedure Summary       Date: 03/01/24 Room / Location: [facility] / [facility]    Anesthesia Start: 0730 Anesthesia Stop: 1015    Procedure: C4-5, C5-6 ANTERIOR CERVICAL DECOMPRESSION AND FUSION (Neck) Diagnosis:       Other cervical disc degeneration at C4-C5 level      (Other cervical disc degeneration at C4-C5 level [M50.321])    Surgeons: Hugh Koo MD Responsible Provider: Jourdan Sky MD    Anesthesia Type: general ASA Status: 2            Anesthesia Type: general    Vitals Value Taken Time   /73 03/01/24 1016   Temp 36.8 03/01/24 1018   Pulse 80 03/01/24 1016   Resp 14 03/01/24 1016   SpO2 96 % 03/01/24 1016   Vitals shown include unvalidated device data.    Anesthesia Post Evaluation    Patient location during evaluation: PACU  Patient participation: complete - patient participated  Level of consciousness: sleepy but conscious  Pain management: adequate  Airway patency: patent  Cardiovascular status: acceptable, blood pressure returned to baseline and hemodynamically stable  Respiratory status: acceptable, nonlabored ventilation, spontaneous ventilation, face mask, oral airway and unassisted  Hydration status: acceptable  Postoperative Nausea and Vomiting: none      There were no known notable events for this encounter.

## 2024-03-01 NOTE — ANESTHESIA PREPROCEDURE EVALUATION
Patient: Marla Nash    Procedure Information       Date/Time: 03/01/24 0730    Procedure: CERVICAL:  C4-5, C5-6 ANTERIOR CERVICAL DECOMPRESSION AND FUSION, INSTRUMENTATION 23 HR OBS BEDDED OUTPT, CANDIS TABLE FLAT, TRIFECTA BONE GRAFT 2 LEVELS, NEW MICROSCOPE, NEW C-ARM, MISONIX BONE SCALPAL, PNEUMATIC KERRISONS, SPINAL CORD MONITORING, ASPEN COLLAR, GLOBUS PLATE + CAGES, diabetic (Neck) - 23 HR OBS BEDDED OUTPT    Location: [facility]  / [facility] OR    Surgeons: Hugh Koo MD            Relevant Problems   Neuro/Psych   (+) Cervical radiculopathy      Musculoskeletal   (+) Knee osteoarthritis   (+) Other cervical disc degeneration at C4-C5 level       Clinical information reviewed:   Tobacco  Allergies  Meds  Problems  Med Hx  Surg Hx  OB Status    Fam Hx  Soc Hx        NPO Detail:  NPO/Void Status  Carbohydrate Drink Given Prior to Surgery? : N  Date of Last Liquid: 02/29/24  Time of Last Liquid: 2230  Date of Last Solid: 02/29/24  Time of Last Solid: 1900  Last Intake Type: Clear fluids  Time of Last Void: 0612         Physical Exam    Airway  Mallampati: II  TM distance: >3 FB  Neck ROM: full     Cardiovascular - normal exam     Dental    Pulmonary - normal exam     Abdominal - normal exam             Anesthesia Plan    History of general anesthesia?: yes  History of complications of general anesthesia?: no    ASA 2     general     The patient is not a current smoker.  Patient did not smoke on day of procedure.    intravenous induction   Anesthetic plan and risks discussed with patient.    Plan discussed with CRNA and attending.

## 2024-03-01 NOTE — ANESTHESIA PROCEDURE NOTES
Airway  Date/Time: 3/1/2024 7:41 AM  Urgency: elective    Airway not difficult    Staffing  Performed: CRNA   Authorized by: Jourdan Sky MD    Performed by: CAIN Corbin-MEGHANA  Patient location during procedure: OR    Indications and Patient Condition  Indications for airway management: anesthesia and airway protection  Spontaneous Ventilation: absent  Sedation level: deep  Preoxygenated: yes  Mask difficulty assessment: 2 - vent by mask + OA or adjuvant +/- NMBA    Final Airway Details  Final airway type: endotracheal airway      Successful airway: ETT  Cuffed: yes   Successful intubation technique: video laryngoscopy ([place])  Facilitating devices/methods: intubating stylet and cricoid pressure  Blade size: #3  ETT size (mm): 7.0  Cormack-Lehane Classification: grade IIa - partial view of glottis  Placement verified by: chest auscultation and capnometry   Measured from: gums  ETT to gums (cm): 22  Number of attempts at approach: 1

## 2024-03-01 NOTE — DISCHARGE INSTRUCTIONS
-No heavy lifting (more than 10 pounds) or straining until patient is seen in the office.  -Okay to remove dressing in 48 hours and get wound wet in the shower.  Do not scrub wound.  Cover wound with dry dressing after shower.  No baths, hot tubs, or pools.  -Encourage ambulation at least 3 times a day.  -No formal physical therapy until ordered by Dr. Koo.  -Follow-up in the office in 2 weeks.  Appointment should already be made.  -You must be accompanied by a responsible adult upon discharge and for 24 hours after surgery.  Do not drive a motor vehicle, operate machinery, power tools or appliances, drink alcoholic beverages, or make critical decisions for 24 hours and while on narcotic pain meds.  -Be aware of dizziness, which may cause a fall.  Change positions slowly.  -You may resume your regular diet, but do so slowly.  -Use your pain medication prescription as prescribed by your physician.  If possible, take your medication with food, and drink plenty of fluids.  -Call your surgeon if you have questions, temperature of 101° or greater, increased bleeding swelling or pain, drainage from the incision or increased redness around the incision.  -Call [phone] with any questions or concerns.

## 2024-03-01 NOTE — H&P
"History Of Present Illness  Marla Nash is a 53 y.o. female presenting with cervical radiculopathy.     Past Medical History  She has a past medical history of Anesthesia of skin (09/28/2021), Chronic headaches, Diabetes mellitus (CMS/HCC), Disease of digestive system, unspecified (09/28/2021), Endometriosis, Other general symptoms and signs (09/28/2021), Personal history of other diseases of the musculoskeletal system and connective tissue (09/28/2021), Personal history of other endocrine, nutritional and metabolic disease (09/28/2021), and Seasonal allergies.    Surgical History  She has a past surgical history that includes Knee cartilage surgery (Left, 09/28/2021); Partial hysterectomy; Breast surgery; and Colonoscopy.     Social History  She reports that she has never smoked. She has never used smokeless tobacco. She reports that she does not currently use alcohol. She reports that she does not use drugs.    Family History  No family history on file.     Allergies  Cat dander    Review of Systems     Physical Exam     Last Recorded Vitals  Blood pressure (!) 138/92, pulse 82, temperature 36.5 °C (97.7 °F), temperature source Temporal, resp. rate 18, height 1.6 m (5' 3\"), weight 105 kg (231 lb 14.8 oz), SpO2 95 %.    Relevant Results      Scheduled medications  ceFAZolin, 2 g, intravenous, Once      Continuous medications  [START ON 3/2/2024] lactated Ringer's, 100 mL/hr, Last Rate: 100 mL/hr (03/01/24 0616)      PRN medications    Results for orders placed or performed during the hospital encounter of 03/01/24 (from the past 24 hour(s))   POCT GLUCOSE   Result Value Ref Range    POCT Glucose 134 (H) 74 - 99 mg/dL       Assessment/Plan   Principal Problem:    Other cervical disc degeneration at C4-C5 level      Patient with cervical stenosis and cervical radiculopathy at C4-5 and C5-6.  Regarding form a C4-5 and C5-6 ACDF.       I spent . minutes in the professional and overall care of this " patient.      Hugh Koo MD

## 2024-03-01 NOTE — OP NOTE
Preoperative diagnosis: C4-5 and C5-6 stenosis    Postop diagnosis: Above    Procedure:1) C4-5 and C5-6 anterior cervical decompression and fusion decompressing centrally and bilateral foraminally.                   2) C4-5 and C5-6 instrumentation.                   3) C4-5 and C5-6 use of biologic bone graft.                   4) use of operative microscope.    Surgeon: Hugh Koo M.D.    Asst.: Tyolr PUENTESThe physician assistant was present through the entire case.  Given the nature of the disease process and the procedure to be performed a skilled surgical assistant was necessary during the case.  The assistant was necessary in order to hold retractors and directly assist in the operation.  A certified scrub tech was at the back table managing instruments and supplies for the surgical case.    Anesthesia: General    Blood Loss: 50 cc    Complications: None    HPI: The patient had neck and arm symptoms from the above described condition.  The patient failed all nonoperative treatment.  All the risks, benefits, and potential complications for operative and nonoperative treatment were discussed at length with the the patient.  Risks of operative intervention include, but are not limited to: Anesthesia complications, excessive blood loss, infection, damaged uninjured structures including, but not limited to: The dural sac, nerve roots, spinal cord, vertebral artery, trachea, esophagus, and carotid artery, blood clots and lack of improvement or worsening of symptoms.  These complications could result in death, permanent disability including paralysis, swallowing or speech difficulty, or need for reoperation.  The patient completely understood these risks and wished to proceed with operative intervention.    Operative implants: NuVasive ACP plate and screws.  Gl[place] [place] allograft cage.  Viacell bone graft.    Operative procedure: The patient was wheeled from the preanesthesia care unit to the operating  theater.  The patient was placed in supine position and all bony prominences were well-padded.  For the entire procedure anesthesia maintainined control of the patient's head neck.  General anesthesia was induced.  A shoulder roll was placed between the patient's shoulder blades.  The arms were well-padded and tucked to the patient's side.  Tape was used to provide longitudinal traction over the foot of the bed.  The head was placed in neutral rotation and an amount of extension that was determined in the preoperative hold area that was comfortable for the patient, and was taped in this position.  Next, a needle was taped to the outside of the patient's neck and x-rays were taken to confirm the appropriate skin incision level.  The neck was then marked and prepped and draped in a normal sterile fashion.    Timeout was performed, site verification marking was verified, and appropriate antibiotic demonstration was confirmed.  Next an incision over the C5 vertebral body level was performed.  Dissection was carried down to the skin with a knife and Bovie was used to dissect down through the platysma.  Pickups and scissors were used to dissect down to expose the interval between the trachea, esophagus and strap muscles in the midline and the carotid sheath and sternocleidomastoid laterally.  Appendiceal retractors were used and at this point the prevertebral fascia overlying the spine was visualized.  Peanuts were used to spread the fascia exposing the vertebral bodies and disc and longus coli muscle.  A bent spinal needle was inserted into the C5-6 level.  X-rays were taken confirm the appropriate level.    Next, using appendiceal hand-held retractors the Bovie was used to lift the soft tissues and longus coli out to the level of the uncovertebral joints bilaterally from the caudal portion of C4 to the cephalad portion of C6.  The Myers retractor was then placed under the longus coli muscle on bone centered over the  C5-6 level.  The microscope was then brought in for use.    Cloward retractors were used to protect cephalad and caudal in the blades from the Myers retractor were protecting medially and laterally.  A 5 mm round bur was then used to take down the anterior osteophytes.  A knife was used to box cut the disc and a pituitary rongeur was used to remove the annulus.  A pituitary rongeur was then used to remove the anterior two thirds of the disc.  A [place] pin retractor was then placed to provide distraction of the disc space.  O and #2 curettes were then used to take the disc completely off of the cephalad and caudal endplates laterally out to the uncovertebral joints.  Curved 6-0 curette was then used to reach behind the cephalad and caudal vertebral bodies.  First starting on the patient's right side #2 Kerrison was used to perform a foraminotomy decompressing the foramen and removing posterior osteophytes behind the cephalad and caudal vertebral bodies.  This decompression was taken across to the contralateral side taking down all posterior osteophytes decompressing centrally and performing a foraminotomy on the contralateral side as well.  The central decompression was taken down to almost the full thickness of the posterior longitudinal ligament.  However, the ligament was left intact.  At this point there was complete decompression centrally and bilateral foraminally.  A matchstick bur was used to square the endplates without violating the full-thickness of the endplates.  The wound was irrigated copiously.  FloSeal was used for the entire case to maintain hemostasis but no FloSeal was left in the patient.  At this point there was no bleeding.    Next, the chemical procedure was performed at C4-5.  Central and bilateral foraminal decompression was performed as described above.  Hemostasis was maintained as described above.  Endplate preparation was performed as described above.    Trialing was performed and  size 6 cages were found to be appropriate at both levels.  The cages were filled with appropriately prepared bone graft and inserted under tension and tension was removed.  X-rays were taken to confirm appropriate cage positioning.  A plate was then affixed anteriorly and all holes in the plate were hand drilled and filled.  The screws were locked into position using the locking device on the plate.  X-rays were taken and confirmed appropriate cage positioning as well as plate and screw positioning.    At this point there was no bleeding and the wound was irrigated with copious amounts of normal saline.  The platysma was closed with running 2-0 Vicryl.  The skin was closed with 3-0 Vicryl and 4-0 Monocryl and Steri-Strips.  A sterile dressing was applied.  The patient was placed into a cervical collar.  Spinal cord monitoring was used throughout the entire case and there was no evidence of any spinal cord monitoring abnormal changes.  At the conclusion of the case the patient's fingers and toes were pink and warm with brisk capillary refill.  The patient tolerated the procedure well.        This dictation was created using voice recognition software.  If there are any questions about inaccuracies please do not hesitate to contact me.

## 2024-03-06 NOTE — TELEPHONE ENCOUNTER
Patient called and had recent back surgery wondering if she is able to take Aleve, I talked to Brigid that works with Dr. Koo, she says she can take it sparingly like once every couple days, but she the patient can also take Tylenol.  Called patient let them know of the team's recommendations

## 2024-03-07 ENCOUNTER — TELEPHONE (OUTPATIENT)
Dept: ORTHOPEDIC SURGERY | Facility: CLINIC | Age: 54
End: 2024-03-07
Payer: COMMERCIAL

## 2024-03-15 ENCOUNTER — OFFICE VISIT (OUTPATIENT)
Dept: ORTHOPEDIC SURGERY | Facility: CLINIC | Age: 54
End: 2024-03-15
Payer: COMMERCIAL

## 2024-03-15 ENCOUNTER — HOSPITAL ENCOUNTER (OUTPATIENT)
Dept: RADIOLOGY | Facility: CLINIC | Age: 54
Discharge: HOME | End: 2024-03-15
Payer: COMMERCIAL

## 2024-03-15 DIAGNOSIS — M54.12 CERVICAL RADICULOPATHY: ICD-10-CM

## 2024-03-15 PROCEDURE — 1036F TOBACCO NON-USER: CPT | Performed by: ORTHOPAEDIC SURGERY

## 2024-03-15 PROCEDURE — 99024 POSTOP FOLLOW-UP VISIT: CPT | Performed by: ORTHOPAEDIC SURGERY

## 2024-03-15 PROCEDURE — 72040 X-RAY EXAM NECK SPINE 2-3 VW: CPT

## 2024-03-15 PROCEDURE — 72040 X-RAY EXAM NECK SPINE 2-3 VW: CPT | Performed by: ORTHOPAEDIC SURGERY

## 2024-03-15 NOTE — PROGRESS NOTES
Chief complaint: 2 weeks out C4-5 C5-6 ACDF.    HPI: Patient is at all of her preoperative numbness in her hand is gone.  Her shoulder pain is decreasing on the right side as well.  Otherwise she is not sure how she is progressing but she certainly does not feel worse.  Her swallowing is just about back to completely normal.  Her voice is normal.  There is no fevers chills nausea vomiting.  There is no bowel or bladder changes.    Physical exam: Incision is perfectly well-healed.  She has appropriate neck range of motion.  She has good strength in her bilateral extremities.  She has positive impingement symptoms of that right shoulder.    X-rays of the cervical spine show good positioning of cage plate and screws.    Assessment/plan: Patient doing well after two-level ACDF.  Will let her engage in light activities and not lifting more than 10 pounds.  Will see her back in 3 months for AP lateral x-rays of the cervical spine.

## 2024-06-14 ENCOUNTER — HOSPITAL ENCOUNTER (OUTPATIENT)
Dept: RADIOLOGY | Facility: CLINIC | Age: 54
Discharge: HOME | End: 2024-06-14
Payer: COMMERCIAL

## 2024-06-14 ENCOUNTER — OFFICE VISIT (OUTPATIENT)
Dept: ORTHOPEDIC SURGERY | Facility: CLINIC | Age: 54
End: 2024-06-14
Payer: COMMERCIAL

## 2024-06-14 DIAGNOSIS — M54.12 CERVICAL RADICULOPATHY: Primary | ICD-10-CM

## 2024-06-14 DIAGNOSIS — M54.12 CERVICAL RADICULOPATHY: ICD-10-CM

## 2024-06-14 PROCEDURE — 72040 X-RAY EXAM NECK SPINE 2-3 VW: CPT

## 2024-06-14 PROCEDURE — 99213 OFFICE O/P EST LOW 20 MIN: CPT | Performed by: ORTHOPAEDIC SURGERY

## 2024-06-14 NOTE — PROGRESS NOTES
Marla Nash is a 53 y.o. female who presents for Follow-up of the Neck (C4-5, C5-6 ACDF 3/1/24/3 1/2 months out/X-rays today).    HPI:  53-year-old female here for surgical follow-up.  She is 3 months out from a C4-5 C5-6 ACDF.  She denies any fever chills nausea vomiting night sweats.  She has no bowel or bladder complaints.    Physical exam:  Well-nourished, well kept.  No lymphangitis or lymphadenopathy in the examined extremities. Affect normal.  Alert and oriented X 3.  Coordination normal.  Patient can rise from a seated position, can sit from a standing position. Can stand on heels and toes.  Patient is minimally tender in the paraspinal musculature of the cervical spine. range of motion is mildly decreased secondary to some pain and stiffness no weakness no instability to muscle strength. examination of the upper extremities reveals no point tenderness, swelling, or deformity.  Range of motion of the shoulders, elbows, wrists, and fingers are full without crepitance, instability, or exacerbation of pain, except I get some mild Johnson and mild Neer on the right shoulder. Strength is 5/5 throughout. no redness, abrasions, or lesions on the upper extremities bilaterally.  Gross sensation intact to the extremities.  Monroe negative.     Imaging studies:  AP lateral plain films of the cervical spine were ordered and reviewed today.    Assessment:  53-year-old female here for surgical follow-up.  She is 3 months out from a C4-5 C5-6 ACDF.  She is doing well today.  She thinks she is probably 90% better.  The pain that she was having in the neck and arms is significantly improved her right shoulder although is still sore she feels like it has improved as well.  I still get a little bit of shoulder impingement findings on her right shoulder.  She has no difficulty swallowing, her voice is back to normal.  She is happy with the outcome.    For complete plan and/or surgical details, please refer to Dr. Koo's  portion of this split dictation.    -Vaibhav Cross PA-C    In a face-to-face encounter, I performed a history and physical examination, discussed pertinent diagnostic studies if indicated, and discussed diagnosis and management strategies with both the patient and the midlevel provider.  I reviewed the midlevel's note and agree with the documented findings and plan of care.    Patient doing very well 3 months out from a C4-5 and C5-6 ACDF.  She is at least 90% better.  Almost all of her preoperative arm symptoms are gone.  She was having some tightness and stiffness in the trapezius and posterior neck but that settled down as well.  She has no fevers chills nausea vomiting.  X-rays were taken and reviewed today and look good today with good positioning of hardware.  Fusion appears to be starting to take but has not taken yet.  We will see her back in 3 months for AP lateral x-rays of the cervical spine.    Hugh Koo MD  Orthopedic surgery

## 2024-08-05 ENCOUNTER — OFFICE VISIT (OUTPATIENT)
Dept: ORTHOPEDIC SURGERY | Facility: CLINIC | Age: 54
End: 2024-08-05
Payer: COMMERCIAL

## 2024-08-05 ENCOUNTER — HOSPITAL ENCOUNTER (OUTPATIENT)
Dept: RADIOLOGY | Facility: CLINIC | Age: 54
Discharge: HOME | End: 2024-08-05
Payer: COMMERCIAL

## 2024-08-05 DIAGNOSIS — M25.561 RIGHT KNEE PAIN, UNSPECIFIED CHRONICITY: ICD-10-CM

## 2024-08-05 DIAGNOSIS — M23.91 INTERNAL DERANGEMENT OF RIGHT KNEE: ICD-10-CM

## 2024-08-05 PROCEDURE — 99214 OFFICE O/P EST MOD 30 MIN: CPT | Performed by: STUDENT IN AN ORGANIZED HEALTH CARE EDUCATION/TRAINING PROGRAM

## 2024-08-05 PROCEDURE — 99213 OFFICE O/P EST LOW 20 MIN: CPT | Performed by: STUDENT IN AN ORGANIZED HEALTH CARE EDUCATION/TRAINING PROGRAM

## 2024-08-05 PROCEDURE — 73564 X-RAY EXAM KNEE 4 OR MORE: CPT | Mod: RIGHT SIDE | Performed by: STUDENT IN AN ORGANIZED HEALTH CARE EDUCATION/TRAINING PROGRAM

## 2024-08-05 PROCEDURE — 73564 X-RAY EXAM KNEE 4 OR MORE: CPT | Mod: RT

## 2024-08-05 RX ORDER — NAPROXEN 500 MG/1
500 TABLET ORAL 2 TIMES DAILY
Qty: 60 TABLET | Refills: 0 | Status: SHIPPED | OUTPATIENT
Start: 2024-08-05 | End: 2024-09-04

## 2024-08-05 NOTE — PROGRESS NOTES
Chief Complaint   Patient presents with    Left Knee - Follow-up     Acute lateral meniscus tear   DOI  - on going pain  Feels like it's locking up     Right Knee - Pain     Feels more like the muscle and the pain increase with sitting, standing but more when sitting and sleeping at night.       JOSEFINA Gutiérrez is a 53-year-old female presenting today for bilateral knee pain.  Currently the right knee pain is much more severe than the left.  She has been having pain about the posterior lateral aspect of the right knee.  Patient with known history of left knee arthritis did have prior partial meniscectomy many years ago by my partner Dr. White.  Currently the right knee is much more severe than the left.  Last visit I did recommend that patient proceed with gel injections however this was not approved by her insurance.  Endorses occasional episodes of clicking catching popping as well as instability fatigue about the right knee.  However also does have this about the left knee.    Past Medical History:   Diagnosis Date    Anesthesia of skin 09/28/2021    Numbness and tingling    Chronic headaches     Diabetes mellitus (Multi)     Disease of digestive system, unspecified 09/28/2021    Digestive problems    Endometriosis     Other general symptoms and signs 09/28/2021    Eye, ear, nose, and throat symptom    Personal history of other diseases of the musculoskeletal system and connective tissue 09/28/2021    History of arthritis    Personal history of other endocrine, nutritional and metabolic disease 09/28/2021    History of diabetes mellitus    Seasonal allergies        Past Surgical History:   Procedure Laterality Date    BREAST SURGERY      breast reduction    COLONOSCOPY      KNEE CARTILAGE SURGERY Left 09/28/2021    PARTIAL HYSTERECTOMY          Allergies   Allergen Reactions    Cat Dander Unknown        Physical exam    General: Alert and oriented to place, person, and time.  No acute distress and breathing  comfortably; pleasant and cooperative with the examination.  HEENT: Head is normocephalic and atraumatic.  Neck: Supple, no visible swelling.  Cardiovascular: Good perfusion to the affected extremity.  Lungs: No audible wheezing or labored breathing.  Abdomen: Nondistended  HEME/Lymph : No visible abnormalities bilateral lower extremity    Extremity:  Bilateral knee:  Skin healthy and intact  No gross swelling or ecchymosis  No significant varus or valgus malalignment  Effusion: Mild     ROM:  Full flexion   Full extension  No pain with internal rotation of the hip  Tenderness to palpation: Posterior lateral joint line     No laxity to valgus stress  No laxity to varus stress  Negative Lachman´s test  Negative anterior drawer test  Negative posterior drawer test  Positive Aicha´s test     Neurovascular exam normal distally    Diagnostics:  No results found.     Procedure:  Procedures    Assessment:  53-year-old female with concerns for lateral meniscus tear in setting of mild to moderate arthritis right knee, known history of left knee arthritis    Treatment plan:  The natural history of the condition and its associated treatment alternatives including surgical and nonsurgical options were discussed with the patient at length.  The history and clinical exam are consistent with intraarticular pathology and therefore MRI is medically indicated to evaluate the soft tissues of the joint. Follow up in one week or after completion of the MRI to advance management accordingly  The patient understands and agrees with the plan.  Will focus on the right knee currently as the left knee is not nearly as severe as the right  We will provide a prescription for naproxen in the meantime  We discussed the use of nonsteroidal anti-inflammatory drugs as part of a treatment plan. We discussed that these may result in GI upset and/or bleeding. Any stomach pain or dark hard stools would be reason to discontinue use. We discussed that  when used over the long-term these medications can result in altered renal or hepatic function, and that routine use beyond 3 months requires follow-up with their primary provider for monitoring.  They expressed their understanding and agree with the plan.      All of the patient's questions were answered.

## 2024-08-29 ENCOUNTER — HOSPITAL ENCOUNTER (OUTPATIENT)
Dept: RADIOLOGY | Facility: HOSPITAL | Age: 54
Discharge: HOME | End: 2024-08-29
Payer: COMMERCIAL

## 2024-08-29 DIAGNOSIS — M23.91 INTERNAL DERANGEMENT OF RIGHT KNEE: ICD-10-CM

## 2024-08-29 PROCEDURE — 73721 MRI JNT OF LWR EXTRE W/O DYE: CPT | Mod: RT

## 2024-09-09 ENCOUNTER — OFFICE VISIT (OUTPATIENT)
Dept: ORTHOPEDIC SURGERY | Facility: CLINIC | Age: 54
End: 2024-09-09
Payer: COMMERCIAL

## 2024-09-09 DIAGNOSIS — M23.91 INTERNAL DERANGEMENT OF RIGHT KNEE: ICD-10-CM

## 2024-09-09 DIAGNOSIS — S83.282A ACUTE LATERAL MENISCUS TEAR OF LEFT KNEE, INITIAL ENCOUNTER: ICD-10-CM

## 2024-09-09 PROCEDURE — 1036F TOBACCO NON-USER: CPT | Performed by: STUDENT IN AN ORGANIZED HEALTH CARE EDUCATION/TRAINING PROGRAM

## 2024-09-09 PROCEDURE — 99214 OFFICE O/P EST MOD 30 MIN: CPT | Mod: 57 | Performed by: STUDENT IN AN ORGANIZED HEALTH CARE EDUCATION/TRAINING PROGRAM

## 2024-09-09 PROCEDURE — 99214 OFFICE O/P EST MOD 30 MIN: CPT | Performed by: STUDENT IN AN ORGANIZED HEALTH CARE EDUCATION/TRAINING PROGRAM

## 2024-09-09 RX ORDER — HYDROCODONE BITARTRATE AND ACETAMINOPHEN 5; 325 MG/1; MG/1
1 TABLET ORAL EVERY 6 HOURS PRN
Qty: 21 TABLET | Refills: 0 | Status: SHIPPED | OUTPATIENT
Start: 2024-09-09 | End: 2024-09-16

## 2024-09-09 NOTE — PROGRESS NOTES
Chief Complaint   Patient presents with    Right Knee - Follow-up     MRI results        JOSEFINA Gutiérrez is a 53-year-old female presenting today for right knee pain.  Currently the right knee pain is much more severe than the left.  She has been having pain about the posterior lateral aspect of the right knee.  Patient with known history of left knee arthritis did have prior partial meniscectomy many years ago by my partner Dr. White.  Currently the right knee is much more severe than the left.  Presents today for discussion of MRI results.    Past Medical History:   Diagnosis Date    Anesthesia of skin 09/28/2021    Numbness and tingling    Chronic headaches     Diabetes mellitus (Multi)     Disease of digestive system, unspecified 09/28/2021    Digestive problems    Endometriosis     Other general symptoms and signs 09/28/2021    Eye, ear, nose, and throat symptom    Personal history of other diseases of the musculoskeletal system and connective tissue 09/28/2021    History of arthritis    Personal history of other endocrine, nutritional and metabolic disease 09/28/2021    History of diabetes mellitus    Seasonal allergies        Past Surgical History:   Procedure Laterality Date    BREAST SURGERY      breast reduction    COLONOSCOPY      KNEE CARTILAGE SURGERY Left 09/28/2021    PARTIAL HYSTERECTOMY          Allergies   Allergen Reactions    Cat Dander Unknown    Cephalexin Unknown    Shellfish Containing Products Hives        Physical exam    General: Alert and oriented to place, person, and time.  No acute distress and breathing comfortably; pleasant and cooperative with the examination.  HEENT: Head is normocephalic and atraumatic.  Neck: Supple, no visible swelling.  Cardiovascular: Good perfusion to the affected extremity.  Lungs: No audible wheezing or labored breathing.  Abdomen: Nondistended  HEME/Lymph : No visible abnormalities bilateral lower extremity    Extremity:  Right knee:  Skin healthy and  intact  No gross swelling or ecchymosis  No significant varus or valgus malalignment  Effusion: Mild     ROM:  Full flexion   Full extension  No pain with internal rotation of the hip  Tenderness to palpation: Posterior lateral joint line     No laxity to valgus stress  No laxity to varus stress  Negative Lachman´s test  Negative anterior drawer test  Negative posterior drawer test  Positive Aicha´s test     Neurovascular exam normal distally    Diagnostics:  MR knee right wo IV contrast    Result Date: 8/29/2024  Interpreted By:  Mike Fuentes, STUDY: MRI of the right knee without contrast dated 8/29/2024.   INDICATION: Signs/Symptoms:pain   COMPARISON: None.   ACCESSION NUMBER(S): [id number]   ORDERING CLINICIAN: MEHUL HOLMAN   TECHNIQUE: Multiplanar multisequence MRI of the right knee was performed without intravenous contrast.   FINDINGS: MUSCLES, TENDONS, AND LIGAMENTS:   The anterior cruciate ligament is intact.  The posterior cruciate ligament is intact. There is thickening with outward bowing of the medial collateral ligament complex. There is a small volume of fluid in the medial collateral ligament bursa with some septations in the inferior bursa. The lateral collateral ligament complex is intact. The popliteus and biceps femoris tendons, iliotibial band, and extensor mechanism are intact.   MENISCI:   There is a complex tear of the posterior horn into the body of the medial meniscus with horizontal vertical and radial elements of the tearing body of the medial meniscus is extruded peripherally. There is a multilobulated septated cystic structure of the posterior periphery the medial meniscus to posterior periphery of the posterior cruciate ligament measuring up to approximately 1.2 x 2.4 x 1.7 cm. There is free margin radial blunting of the body of the lateral meniscus.   OSSEOUS STRUCTURES AND JOINTS:   No fracture or dislocation is evident. There is marginal femorotibial and patellofemoral  osteophyte formation. There is patellar enthesophyte formation. There is mild thinning of the hyaline articular cartilage in the femorotibial joint spaces overall with some foci of moderate thickness fissuring of the hyaline articular cartilage of the lateral femoral condyle and appositional central weight-bearing lateral tibial plateau. There is overall mild-to-moderate diffuse thinning of the hyaline articular cartilage in the patellofemoral joint space with foci of full-thickness fissuring/loss along the central femoral trochlea and at the patellar apex extending onto the medial and lateral facets with some reactive marrow changes in the patella. There is a small volume knee joint effusion.   SOFT TISSUES:   There is a small volume of fluid in a popliteal cyst.       1. Complex tearing of the medial meniscus with septated cystic structure at its posterior periphery which may represent parameniscal cyst versus ganglion cyst. 2. Free margin radial blunting of the body of the lateral meniscus. 3. Mild likely chronic sprain of the medial collateral ligament complex related to the above meniscus pathology with associated mild moderate medial collateral ligament bursitis with some synovitis versus associated complex ganglion cyst formation at the inferior aspect of the bursa. 4. Tricompartmental degenerative change of the knee greatest in the patellofemoral joint space.   Signed by: Mike Fuentes 8/29/2024 2:52 PM Dictation workstation:   [id number]       Procedure:  Procedures    Assessment:  53-year-old female with medial and lateral meniscus tears in the setting of mild to moderate arthritis    Treatment plan:  The natural history of the condition and its associated treatment alternatives including surgical and nonsurgical options were discussed with the patient at length.  Constellation of findings discussed with patient in detail today was benefits alternative treatment discussed with her as well using shared  informed she is making does wish to proceed with diagnostic knee arthroscopy partial medial lateral meniscectomy.  Discussed that this will not alleviate her arthritic type pain and discomfort however may help with some of mechanical symptoms that she is having.  Will obtain medical clearance prior to proceeding    We discussed the options of operative versus nonoperative management with the attendant risks and benefits and the patient is interested in surgical treatment. I have discussed the alternatives of continued nonoperative treatment with observation, physical therapy, and / or medication versus surgery with the patient and we have thoughtfully considered these options. The patient wishes to proceed with surgical treatment.     We will proceed with right knee diagnostic arthroscopy and partial medial lateral meniscectomy    The planned surgical procedure includes examination under anesthesia. Anesthetic risks will be discussed with the patient by the anesthetist. Arthroscopic evaluation will be performed of the knee joint.  Then an arthroscopic procedure will be performed which may include debridement or repair of the the meniscus or cartilage, synovectomy, and removal of loose bodies.  In addition, if there are advanced degenerative changes I have advised the patient that this may indeed be a progressive process, ultimately resulting in further pain at some point in the future.    Risks of the procedure include but are not limited to infection, bleeding, persistent pain, compartment syndrome, neurologic injury, pressure sores or burns related to positioning or equipment, failure of repair if performed, weakness, arthorfibrosis or stiffness of the joint, limited function and/or limited use of the extremity. The rare complication of death was discussed with the patient. Also, the possible need for revision surgery was discussed.     All this was discussed with the patient and consent obtained. All questions  were answered. The patient understands and will consider the surgical options with the above risks in mind. If repair is performed of the meniscus a brace may be provided as  part of a treatment plan which will include wearing the immobilizer at all times.    Patient was given Norco for postoperative pain control.  We will pick this up prior to surgery so that they are not looking for during the day of surgery. I have personally reviewed the OARRS report for this patient. This report is scanned into  the electronic medical record. I have considered the risks of abuse, dependence, addiction, and diversion. They currently report a pain of 8.    Regarding DVT prophylaxis, recommend generalized ambulation          All of the patient's questions were answered.

## 2024-09-11 ENCOUNTER — TELEPHONE (OUTPATIENT)
Dept: ORTHOPEDIC SURGERY | Facility: CLINIC | Age: 54
End: 2024-09-11
Payer: COMMERCIAL

## 2024-09-11 NOTE — TELEPHONE ENCOUNTER
"   Recent Vitals     3/1/2024  1147 3/1/2024  1156 3/1/2024  1236 Most Recent Value   Weight: -- -- -- 105 kg (231 lb 14.8 oz)  as of 3/1/2024   Body Mass Index:    None   BP: 150/80 156/82 147/82 147/82  as of 3/1/2024   Heart Rate: 72 74 75 75  as of 3/1/2024   Resp: 18 18 18 18  as of 3/1/2024   Temp: 35.9 °C (96.6 °F) 35.9 °C (96.6 °F) 36 °C (96.8 °F) 36 °C (96.8 °F)  as of 3/1/2024   SpO2: 95 % 94 % 95 % 95%  as of 3/1/2024   Height: -- -- -- 1.6 m (5' 3\")  as of 3/1/2024   Peak Flow: -- -- -- Not taken   LMP:    None   Body Surface Area:    None   Adjusted Weight:    None   Dosing Weight:    None     "

## 2024-09-17 ENCOUNTER — OFFICE VISIT (OUTPATIENT)
Dept: ORTHOPEDIC SURGERY | Facility: CLINIC | Age: 54
End: 2024-09-17
Payer: COMMERCIAL

## 2024-09-17 ENCOUNTER — HOSPITAL ENCOUNTER (OUTPATIENT)
Dept: RADIOLOGY | Facility: CLINIC | Age: 54
Discharge: HOME | End: 2024-09-17
Payer: COMMERCIAL

## 2024-09-17 DIAGNOSIS — M54.2 NECK PAIN: ICD-10-CM

## 2024-09-17 PROCEDURE — 72040 X-RAY EXAM NECK SPINE 2-3 VW: CPT | Performed by: ORTHOPAEDIC SURGERY

## 2024-09-17 PROCEDURE — 72040 X-RAY EXAM NECK SPINE 2-3 VW: CPT

## 2024-09-17 PROCEDURE — 99213 OFFICE O/P EST LOW 20 MIN: CPT | Performed by: ORTHOPAEDIC SURGERY

## 2024-09-17 PROCEDURE — 99214 OFFICE O/P EST MOD 30 MIN: CPT | Performed by: ORTHOPAEDIC SURGERY

## 2024-09-17 NOTE — PROGRESS NOTES
Chief complaint: 6 months out C4-5 C5-6 ACDF    HPI: Patient says her neck is feeling good.  She never had a lot of neck pain before surgery=  At this point she does have some neck pain intermittently but is only mild.  As far as her right arm symptoms are concerned they are significantly improved.  She does have some right shoulder issue so it is hard for her to differentiate those from her radiculopathy.  When she does prolonged activity she does have some increased pain in her arm as well as some numbness in her right hand but all in all she is functioning much better.  No left-sided symptoms.    Physical exam: Incisions peripherally well-healed.  She has appropriate neck range of motion without any pain.  She has good strength Bieler upper extremities.    X-rays were taken and reviewed today and it shows good positioning of the cages plate and screws.  It is unclear whether she is fused or not but it looks like she probably has.    Assessment/plan: 6 months out two-level ACDF.  We will let the patient continue to engage in activities as tolerated.  Dr. Anthony is treating her right knee as well as her right shoulder.  She can follow-up with me in 6 months for AP lateral x-rays.  She has 2 chronic stable problems of the neck pain and mild right arm radiculopathy.  I reviewed a note from Dr. Checo Anthony from September 9 which shows that he is planning on doing a knee scope on her for meniscus tears I also see glucose readings of 144 for her last reading which is concerning for nerve healing.

## 2024-10-29 ENCOUNTER — LAB (OUTPATIENT)
Dept: LAB | Facility: HOSPITAL | Age: 54
End: 2024-10-29
Payer: COMMERCIAL

## 2024-10-29 ENCOUNTER — HOSPITAL ENCOUNTER (OUTPATIENT)
Dept: CARDIOLOGY | Facility: HOSPITAL | Age: 54
Discharge: HOME | End: 2024-10-29
Payer: COMMERCIAL

## 2024-10-29 DIAGNOSIS — Z01.818 PRE-OP EXAMINATION: ICD-10-CM

## 2024-10-29 LAB
ALBUMIN SERPL BCP-MCNC: 4.2 G/DL (ref 3.4–5)
ALP SERPL-CCNC: 80 U/L (ref 33–110)
ALT SERPL W P-5'-P-CCNC: 23 U/L (ref 7–45)
ANION GAP SERPL CALC-SCNC: 11 MMOL/L (ref 10–20)
APTT PPP: 30 SECONDS (ref 27–38)
AST SERPL W P-5'-P-CCNC: 19 U/L (ref 9–39)
ATRIAL RATE: 59 BPM
BASOPHILS # BLD AUTO: 0.04 X10*3/UL (ref 0–0.1)
BASOPHILS NFR BLD AUTO: 0.5 %
BILIRUB SERPL-MCNC: 0.5 MG/DL (ref 0–1.2)
BUN SERPL-MCNC: 9 MG/DL (ref 6–23)
CALCIUM SERPL-MCNC: 8.8 MG/DL (ref 8.6–10.3)
CHLORIDE SERPL-SCNC: 105 MMOL/L (ref 98–107)
CO2 SERPL-SCNC: 28 MMOL/L (ref 21–32)
CREAT SERPL-MCNC: 0.77 MG/DL (ref 0.5–1.05)
EGFRCR SERPLBLD CKD-EPI 2021: >90 ML/MIN/1.73M*2
EOSINOPHIL # BLD AUTO: 0.35 X10*3/UL (ref 0–0.7)
EOSINOPHIL NFR BLD AUTO: 4.7 %
ERYTHROCYTE [DISTWIDTH] IN BLOOD BY AUTOMATED COUNT: 13.8 % (ref 11.5–14.5)
EST. AVERAGE GLUCOSE BLD GHB EST-MCNC: 117 MG/DL
GLUCOSE SERPL-MCNC: 87 MG/DL (ref 74–99)
HBA1C MFR BLD: 5.7 %
HCT VFR BLD AUTO: 36.2 % (ref 36–46)
HGB BLD-MCNC: 12 G/DL (ref 12–16)
IMM GRANULOCYTES # BLD AUTO: 0.01 X10*3/UL (ref 0–0.7)
IMM GRANULOCYTES NFR BLD AUTO: 0.1 % (ref 0–0.9)
INR PPP: 1 (ref 0.9–1.1)
LYMPHOCYTES # BLD AUTO: 2.98 X10*3/UL (ref 1.2–4.8)
LYMPHOCYTES NFR BLD AUTO: 40.2 %
MCH RBC QN AUTO: 28.6 PG (ref 26–34)
MCHC RBC AUTO-ENTMCNC: 33.1 G/DL (ref 32–36)
MCV RBC AUTO: 86 FL (ref 80–100)
MONOCYTES # BLD AUTO: 0.47 X10*3/UL (ref 0.1–1)
MONOCYTES NFR BLD AUTO: 6.3 %
NEUTROPHILS # BLD AUTO: 3.56 X10*3/UL (ref 1.2–7.7)
NEUTROPHILS NFR BLD AUTO: 48.2 %
NRBC BLD-RTO: 0 /100 WBCS (ref 0–0)
P AXIS: 10 DEGREES
P OFFSET: 211 MS
P ONSET: 156 MS
PLATELET # BLD AUTO: 337 X10*3/UL (ref 150–450)
POTASSIUM SERPL-SCNC: 4.3 MMOL/L (ref 3.5–5.3)
PR INTERVAL: 132 MS
PROT SERPL-MCNC: 6.6 G/DL (ref 6.4–8.2)
PROTHROMBIN TIME: 10.9 SECONDS (ref 9.8–12.8)
Q ONSET: 222 MS
QRS COUNT: 9 BEATS
QRS DURATION: 80 MS
QT INTERVAL: 402 MS
QTC CALCULATION(BAZETT): 397 MS
QTC FREDERICIA: 399 MS
R AXIS: 17 DEGREES
RBC # BLD AUTO: 4.19 X10*6/UL (ref 4–5.2)
SODIUM SERPL-SCNC: 140 MMOL/L (ref 136–145)
T AXIS: 7 DEGREES
T OFFSET: 423 MS
VENTRICULAR RATE: 59 BPM
WBC # BLD AUTO: 7.4 X10*3/UL (ref 4.4–11.3)

## 2024-10-29 PROCEDURE — 93005 ELECTROCARDIOGRAM TRACING: CPT

## 2024-10-29 PROCEDURE — 85025 COMPLETE CBC W/AUTO DIFF WBC: CPT

## 2024-10-29 PROCEDURE — 83036 HEMOGLOBIN GLYCOSYLATED A1C: CPT | Mod: ELYLAB

## 2024-10-29 PROCEDURE — 85730 THROMBOPLASTIN TIME PARTIAL: CPT

## 2024-10-29 PROCEDURE — 84075 ASSAY ALKALINE PHOSPHATASE: CPT

## 2024-10-29 PROCEDURE — 36415 COLL VENOUS BLD VENIPUNCTURE: CPT

## 2024-10-29 PROCEDURE — 85610 PROTHROMBIN TIME: CPT

## 2024-10-29 PROCEDURE — 93010 ELECTROCARDIOGRAM REPORT: CPT | Performed by: INTERNAL MEDICINE

## 2024-11-25 ENCOUNTER — DOCUMENTATION (OUTPATIENT)
Dept: ORTHOPEDIC SURGERY | Facility: CLINIC | Age: 54
End: 2024-11-25
Payer: COMMERCIAL

## 2024-11-25 DIAGNOSIS — M23.91 INTERNAL DERANGEMENT OF RIGHT KNEE: ICD-10-CM

## 2024-11-25 RX ORDER — HYDROCODONE BITARTRATE AND ACETAMINOPHEN 5; 325 MG/1; MG/1
1 TABLET ORAL EVERY 6 HOURS PRN
Qty: 15 TABLET | Refills: 0 | Status: SHIPPED | OUTPATIENT
Start: 2024-11-25

## 2024-11-25 NOTE — PROGRESS NOTES
Orthopedics History and Physical  Patient: Marla Nash  Unit/Bed: Room/bed info not found  YOB: 1970  MRN: [id number]  Acct:    Admitting Diagnosis: No admission diagnoses are documented for this encounter.  Date:  (Not on file)  Hospital Day: 0  Attending: No att. providers found         Complaint:  No chief complaint on file.       History of Present Illness:  53-year-old female presenting today for left knee arthroscopy.  No chest pain or shortness of breath.  Risk benefits alternatives of treatment discussed with patient in detail using shared informed decision making they wish to proceed.    Allergies:  Allergies   Allergen Reactions    Cat Dander Unknown    Cephalexin Unknown    Shellfish Containing Products Hives        PMHx:  Past Medical History:   Diagnosis Date    Anesthesia of skin 09/28/2021    Numbness and tingling    Chronic headaches     Diabetes mellitus (Multi)     Disease of digestive system, unspecified 09/28/2021    Digestive problems    Endometriosis     Other general symptoms and signs 09/28/2021    Eye, ear, nose, and throat symptom    Personal history of other diseases of the musculoskeletal system and connective tissue 09/28/2021    History of arthritis    Personal history of other endocrine, nutritional and metabolic disease 09/28/2021    History of diabetes mellitus    Seasonal allergies        PSHx:  Past Surgical History:   Procedure Laterality Date    BREAST SURGERY      breast reduction    COLONOSCOPY      KNEE CARTILAGE SURGERY Left 09/28/2021    PARTIAL HYSTERECTOMY         Social Hx:  Social History     Socioeconomic History    Marital status:    Tobacco Use    Smoking status: Never    Smokeless tobacco: Never   Substance and Sexual Activity    Alcohol use: Not Currently    Drug use: Never    Sexual activity: Defer     Comment: HYSTER       Family Hx:  No family history on file.    Review of Systems:   Review of Systems   Constitutional: Negative.    HENT:  Negative.     Eyes: Negative.    Respiratory: Negative.     Cardiovascular: Negative.    Gastrointestinal: Negative.    Endocrine: Negative.    Genitourinary: Negative.    Neurological: Negative.    Hematological: Negative.    Psychiatric/Behavioral: Negative.         Physical Examination:    There were no vitals taken for this visit.   Physical Exam  Constitutional:       Appearance: Normal appearance.   HENT:      Head: Normocephalic.      Nose: Nose normal.      Mouth/Throat:      Pharynx: Oropharynx is clear.   Eyes:      Pupils: Pupils are equal, round, and reactive to light.   Cardiovascular:      Rate and Rhythm: Normal rate.      Pulses: Normal pulses.   Pulmonary:      Effort: Pulmonary effort is normal.   Abdominal:      General: Bowel sounds are normal.      Palpations: Abdomen is soft.   Musculoskeletal:      Cervical back: Normal range of motion and neck supple.   Skin:     General: Skin is warm and dry.      Capillary Refill: Capillary refill takes less than 2 seconds.   Neurological:      General: No focal deficit present.      Mental Status: She is alert.   Psychiatric:         Mood and Affect: Mood normal.       LABS:  CBC:   Lab Results   Component Value Date    WBC 7.4 10/29/2024    RBC 4.19 10/29/2024    HGB 12.0 10/29/2024    HCT 36.2 10/29/2024    MCV 86 10/29/2024    MCH 28.6 10/29/2024    MCHC 33.1 10/29/2024    RDW 13.8 10/29/2024     10/29/2024     CBC with Differential:    Lab Results   Component Value Date    WBC 7.4 10/29/2024    RBC 4.19 10/29/2024    HGB 12.0 10/29/2024    HCT 36.2 10/29/2024     10/29/2024    MCV 86 10/29/2024    MCH 28.6 10/29/2024    MCHC 33.1 10/29/2024    RDW 13.8 10/29/2024    NRBC 0.0 10/29/2024    LYMPHOPCT 40.2 10/29/2024    MONOPCT 6.3 10/29/2024    EOSPCT 4.7 10/29/2024    BASOPCT 0.5 10/29/2024    MONOSABS 0.47 10/29/2024    LYMPHSABS 2.98 10/29/2024    EOSABS 0.35 10/29/2024    BASOSABS 0.04 10/29/2024     CMP:    Lab Results   Component  "Value Date     10/29/2024    K 4.3 10/29/2024     10/29/2024    CO2 28 10/29/2024    BUN 9 10/29/2024    CREATININE 0.77 10/29/2024    GLUCOSE 87 10/29/2024    PROT 6.6 10/29/2024    CALCIUM 8.8 10/29/2024    BILITOT 0.5 10/29/2024    ALKPHOS 80 10/29/2024    AST 19 10/29/2024    ALT 23 10/29/2024     BMP:    Lab Results   Component Value Date     10/29/2024    K 4.3 10/29/2024     10/29/2024    CO2 28 10/29/2024    BUN 9 10/29/2024    CREATININE 0.77 10/29/2024    CALCIUM 8.8 10/29/2024    GLUCOSE 87 10/29/2024         Lipid Panel:  No results found for: \"HDL\", \"CHHDL\", \"VLDL\", \"TRIG\", \"NHDL\"   Current Medications:    Current Outpatient Medications:     cetirizine (ZyrTEC) 10 mg tablet, Take 2 tablets (20 mg) by mouth every 12 hours if needed., Disp: , Rfl:     cholecalciferol (Vitamin D-3) 1,250 mcg (50,000 unit) capsule, Take 1 capsule (50,000 Units) by mouth 1 (one) time per week., Disp: , Rfl:     cholecalciferol (Vitamin D3) 25 MCG (1000 UT) tablet, Take 1 tablet (1,000 Units) by mouth once daily., Disp: , Rfl:     ezetimibe (Zetia) 10 mg tablet, Take 1 tablet (10 mg) by mouth once daily., Disp: , Rfl:     fluticasone (Flonase) 50 mcg/actuation nasal spray, Administer 2 sprays into each nostril once daily., Disp: , Rfl:     mometasone (Elocon) 0.1 % cream, 2 times a day. Apply to ear, Disp: , Rfl:     ECG 12 Lead    Result Date: 10/29/2024  Sinus bradycardia with sinus arrhythmia Normal ECG When compared with ECG of 19-FEB-2024 09:41, No significant change was found Confirmed by Hugh Wells ([id number]) on 10/29/2024 11:32:34 AM       No results found for this or any previous visit from the past 1095 days.                       Assessment:    53-year-old female presenting for left knee arthroscopy      Plan:  All questions and concerns answered regarding surgical treatment.  Will proceed with surgery today  The risks of surgery were discussed including but not limited to the risks of " medications given for surgery, the risk of blood loss during and after surgery that can lead to the need for blood products in certain situations, infection, damage to normal structures that can lead to long term problems of pain or dysfunction, wound healing complications, the possibility of nonunion/malunion of any osteotomies and late or chronic pain as a result of the surgical intervention.  In addition potentially life threatening complications that can occur at the time of surgery and after surgery were discussed including but not limited to deep vein thrombosis, pulmonary embolism, myocardial infarction, stroke and death.              Electronically signed by Scar Garcia PA-C on 11/25/2024 at 11:54 AM

## 2024-11-25 NOTE — PROGRESS NOTES
Orthopedics History and Physical  Patient: Marla Nash  Unit/Bed: Room/bed info not found  YOB: 1970  MRN: [id number]  Acct:    Admitting Diagnosis: No admission diagnoses are documented for this encounter.  Date:  (Not on file)  Hospital Day: 0  Attending: No att. providers found         Complaint:  No chief complaint on file.       History of Present Illness:  53-year-old female presenting for right knee arthroscopy.  No chest pain or shortness of breath.  Risk benefits alternatives of treatment discussed with patient in detail using shared informed decision making they wish to proceed.    Allergies:  Allergies   Allergen Reactions    Cat Dander Unknown    Cephalexin Unknown    Shellfish Containing Products Hives        PMHx:  Past Medical History:   Diagnosis Date    Anesthesia of skin 09/28/2021    Numbness and tingling    Chronic headaches     Diabetes mellitus (Multi)     Disease of digestive system, unspecified 09/28/2021    Digestive problems    Endometriosis     Other general symptoms and signs 09/28/2021    Eye, ear, nose, and throat symptom    Personal history of other diseases of the musculoskeletal system and connective tissue 09/28/2021    History of arthritis    Personal history of other endocrine, nutritional and metabolic disease 09/28/2021    History of diabetes mellitus    Seasonal allergies        PSHx:  Past Surgical History:   Procedure Laterality Date    BREAST SURGERY      breast reduction    COLONOSCOPY      KNEE CARTILAGE SURGERY Left 09/28/2021    PARTIAL HYSTERECTOMY         Social Hx:  Social History     Socioeconomic History    Marital status:    Tobacco Use    Smoking status: Never    Smokeless tobacco: Never   Substance and Sexual Activity    Alcohol use: Not Currently    Drug use: Never    Sexual activity: Defer     Comment: HYSTER       Family Hx:  No family history on file.    Review of Systems:   Review of Systems   Constitutional: Negative.    HENT:  Negative.     Eyes: Negative.    Respiratory: Negative.     Cardiovascular: Negative.    Gastrointestinal: Negative.    Endocrine: Negative.    Genitourinary: Negative.    Neurological: Negative.    Hematological: Negative.    Psychiatric/Behavioral: Negative.         Physical Examination:    There were no vitals taken for this visit.   Physical Exam  Constitutional:       Appearance: Normal appearance.   HENT:      Head: Normocephalic.      Nose: Nose normal.      Mouth/Throat:      Pharynx: Oropharynx is clear.   Eyes:      Pupils: Pupils are equal, round, and reactive to light.   Cardiovascular:      Rate and Rhythm: Normal rate.      Pulses: Normal pulses.   Pulmonary:      Effort: Pulmonary effort is normal.   Abdominal:      General: Bowel sounds are normal.      Palpations: Abdomen is soft.   Musculoskeletal:      Cervical back: Normal range of motion and neck supple.   Skin:     General: Skin is warm and dry.      Capillary Refill: Capillary refill takes less than 2 seconds.   Neurological:      General: No focal deficit present.      Mental Status: She is alert.   Psychiatric:         Mood and Affect: Mood normal.       LABS:  CBC:   Lab Results   Component Value Date    WBC 7.4 10/29/2024    RBC 4.19 10/29/2024    HGB 12.0 10/29/2024    HCT 36.2 10/29/2024    MCV 86 10/29/2024    MCH 28.6 10/29/2024    MCHC 33.1 10/29/2024    RDW 13.8 10/29/2024     10/29/2024     CBC with Differential:    Lab Results   Component Value Date    WBC 7.4 10/29/2024    RBC 4.19 10/29/2024    HGB 12.0 10/29/2024    HCT 36.2 10/29/2024     10/29/2024    MCV 86 10/29/2024    MCH 28.6 10/29/2024    MCHC 33.1 10/29/2024    RDW 13.8 10/29/2024    NRBC 0.0 10/29/2024    LYMPHOPCT 40.2 10/29/2024    MONOPCT 6.3 10/29/2024    EOSPCT 4.7 10/29/2024    BASOPCT 0.5 10/29/2024    MONOSABS 0.47 10/29/2024    LYMPHSABS 2.98 10/29/2024    EOSABS 0.35 10/29/2024    BASOSABS 0.04 10/29/2024     CMP:    Lab Results   Component  "Value Date     10/29/2024    K 4.3 10/29/2024     10/29/2024    CO2 28 10/29/2024    BUN 9 10/29/2024    CREATININE 0.77 10/29/2024    GLUCOSE 87 10/29/2024    PROT 6.6 10/29/2024    CALCIUM 8.8 10/29/2024    BILITOT 0.5 10/29/2024    ALKPHOS 80 10/29/2024    AST 19 10/29/2024    ALT 23 10/29/2024     BMP:    Lab Results   Component Value Date     10/29/2024    K 4.3 10/29/2024     10/29/2024    CO2 28 10/29/2024    BUN 9 10/29/2024    CREATININE 0.77 10/29/2024    CALCIUM 8.8 10/29/2024    GLUCOSE 87 10/29/2024         Lipid Panel:  No results found for: \"HDL\", \"CHHDL\", \"VLDL\", \"TRIG\", \"NHDL\"   Current Medications:    Current Outpatient Medications:     cetirizine (ZyrTEC) 10 mg tablet, Take 2 tablets (20 mg) by mouth every 12 hours if needed., Disp: , Rfl:     cholecalciferol (Vitamin D-3) 1,250 mcg (50,000 unit) capsule, Take 1 capsule (50,000 Units) by mouth 1 (one) time per week., Disp: , Rfl:     cholecalciferol (Vitamin D3) 25 MCG (1000 UT) tablet, Take 1 tablet (1,000 Units) by mouth once daily., Disp: , Rfl:     ezetimibe (Zetia) 10 mg tablet, Take 1 tablet (10 mg) by mouth once daily., Disp: , Rfl:     fluticasone (Flonase) 50 mcg/actuation nasal spray, Administer 2 sprays into each nostril once daily., Disp: , Rfl:     mometasone (Elocon) 0.1 % cream, 2 times a day. Apply to ear, Disp: , Rfl:     ECG 12 Lead    Result Date: 10/29/2024  Sinus bradycardia with sinus arrhythmia Normal ECG When compared with ECG of 19-FEB-2024 09:41, No significant change was found Confirmed by Hugh Wells ([id number]) on 10/29/2024 11:32:34 AM       No results found for this or any previous visit from the past 1095 days.                       Assessment:    53-year-old female presenting for right knee arthroscopy      Plan:  All questions and concerns answered regarding surgical treatment.  Will proceed with surgery today  The risks of surgery were discussed including but not limited to the risks of " medications given for surgery, the risk of blood loss during and after surgery that can lead to the need for blood products in certain situations, infection, damage to normal structures that can lead to long term problems of pain or dysfunction, wound healing complications, the possibility of nonunion/malunion of any osteotomies and late or chronic pain as a result of the surgical intervention.  In addition potentially life threatening complications that can occur at the time of surgery and after surgery were discussed including but not limited to deep vein thrombosis, pulmonary embolism, myocardial infarction, stroke and death.              Electronically signed by Scar Garcia PA-C on 11/25/2024 at 11:51 AM

## 2024-11-25 NOTE — PROGRESS NOTES
Orthopedics History and Physical  Patient: Marla Nash  Unit/Bed: Room/bed info not found  YOB: 1970  MRN: [id number]  Acct:    Admitting Diagnosis: No admission diagnoses are documented for this encounter.  Date:  (Not on file)  Hospital Day: 0  Attending: No att. providers found         Complaint:  No chief complaint on file.       History of Present Illness:  53-year-old female presenting for right knee arthroscopy.  No chest pain or shortness of breath.  Risk benefits alternatives of treatment discussed with patient in detail using shared informed decision making they wish to proceed.    Allergies:  Allergies   Allergen Reactions    Cat Dander Unknown    Cephalexin Unknown    Shellfish Containing Products Hives        PMHx:  Past Medical History:   Diagnosis Date    Anesthesia of skin 09/28/2021    Numbness and tingling    Chronic headaches     Diabetes mellitus (Multi)     Disease of digestive system, unspecified 09/28/2021    Digestive problems    Endometriosis     Other general symptoms and signs 09/28/2021    Eye, ear, nose, and throat symptom    Personal history of other diseases of the musculoskeletal system and connective tissue 09/28/2021    History of arthritis    Personal history of other endocrine, nutritional and metabolic disease 09/28/2021    History of diabetes mellitus    Seasonal allergies        PSHx:  Past Surgical History:   Procedure Laterality Date    BREAST SURGERY      breast reduction    COLONOSCOPY      KNEE CARTILAGE SURGERY Left 09/28/2021    PARTIAL HYSTERECTOMY         Social Hx:  Social History     Socioeconomic History    Marital status:    Tobacco Use    Smoking status: Never    Smokeless tobacco: Never   Substance and Sexual Activity    Alcohol use: Not Currently    Drug use: Never    Sexual activity: Defer     Comment: HYSTER       Family Hx:  No family history on file.    Review of Systems:   Review of Systems   Constitutional: Negative.    HENT:  Negative.     Eyes: Negative.    Respiratory: Negative.     Cardiovascular: Negative.    Gastrointestinal: Negative.    Endocrine: Negative.    Genitourinary: Negative.    Neurological: Negative.    Hematological: Negative.    Psychiatric/Behavioral: Negative.         Physical Examination:    There were no vitals taken for this visit.   Physical Exam  Constitutional:       Appearance: Normal appearance.   HENT:      Head: Normocephalic.      Nose: Nose normal.      Mouth/Throat:      Pharynx: Oropharynx is clear.   Eyes:      Pupils: Pupils are equal, round, and reactive to light.   Cardiovascular:      Rate and Rhythm: Normal rate.      Pulses: Normal pulses.   Pulmonary:      Effort: Pulmonary effort is normal.   Abdominal:      General: Bowel sounds are normal.      Palpations: Abdomen is soft.   Musculoskeletal:      Cervical back: Normal range of motion and neck supple.   Skin:     General: Skin is warm and dry.      Capillary Refill: Capillary refill takes less than 2 seconds.   Neurological:      General: No focal deficit present.      Mental Status: She is alert.   Psychiatric:         Mood and Affect: Mood normal.       LABS:  CBC:   Lab Results   Component Value Date    WBC 7.4 10/29/2024    RBC 4.19 10/29/2024    HGB 12.0 10/29/2024    HCT 36.2 10/29/2024    MCV 86 10/29/2024    MCH 28.6 10/29/2024    MCHC 33.1 10/29/2024    RDW 13.8 10/29/2024     10/29/2024     CBC with Differential:    Lab Results   Component Value Date    WBC 7.4 10/29/2024    RBC 4.19 10/29/2024    HGB 12.0 10/29/2024    HCT 36.2 10/29/2024     10/29/2024    MCV 86 10/29/2024    MCH 28.6 10/29/2024    MCHC 33.1 10/29/2024    RDW 13.8 10/29/2024    NRBC 0.0 10/29/2024    LYMPHOPCT 40.2 10/29/2024    MONOPCT 6.3 10/29/2024    EOSPCT 4.7 10/29/2024    BASOPCT 0.5 10/29/2024    MONOSABS 0.47 10/29/2024    LYMPHSABS 2.98 10/29/2024    EOSABS 0.35 10/29/2024    BASOSABS 0.04 10/29/2024     CMP:    Lab Results   Component  "Value Date     10/29/2024    K 4.3 10/29/2024     10/29/2024    CO2 28 10/29/2024    BUN 9 10/29/2024    CREATININE 0.77 10/29/2024    GLUCOSE 87 10/29/2024    PROT 6.6 10/29/2024    CALCIUM 8.8 10/29/2024    BILITOT 0.5 10/29/2024    ALKPHOS 80 10/29/2024    AST 19 10/29/2024    ALT 23 10/29/2024     BMP:    Lab Results   Component Value Date     10/29/2024    K 4.3 10/29/2024     10/29/2024    CO2 28 10/29/2024    BUN 9 10/29/2024    CREATININE 0.77 10/29/2024    CALCIUM 8.8 10/29/2024    GLUCOSE 87 10/29/2024         Lipid Panel:  No results found for: \"HDL\", \"CHHDL\", \"VLDL\", \"TRIG\", \"NHDL\"   Current Medications:    Current Outpatient Medications:     cetirizine (ZyrTEC) 10 mg tablet, Take 2 tablets (20 mg) by mouth every 12 hours if needed., Disp: , Rfl:     cholecalciferol (Vitamin D-3) 1,250 mcg (50,000 unit) capsule, Take 1 capsule (50,000 Units) by mouth 1 (one) time per week., Disp: , Rfl:     cholecalciferol (Vitamin D3) 25 MCG (1000 UT) tablet, Take 1 tablet (1,000 Units) by mouth once daily., Disp: , Rfl:     ezetimibe (Zetia) 10 mg tablet, Take 1 tablet (10 mg) by mouth once daily., Disp: , Rfl:     fluticasone (Flonase) 50 mcg/actuation nasal spray, Administer 2 sprays into each nostril once daily., Disp: , Rfl:     mometasone (Elocon) 0.1 % cream, 2 times a day. Apply to ear, Disp: , Rfl:     ECG 12 Lead    Result Date: 10/29/2024  Sinus bradycardia with sinus arrhythmia Normal ECG When compared with ECG of 19-FEB-2024 09:41, No significant change was found Confirmed by Hugh Wells ([id number]) on 10/29/2024 11:32:34 AM       No results found for this or any previous visit from the past 1095 days.                       Assessment:    53-year-old female presents for right knee arthroscopy      Plan:  All questions and concerns answered regarding surgical treatment.  Will proceed with surgery today  The risks of surgery were discussed including but not limited to the risks of " medications given for surgery, the risk of blood loss during and after surgery that can lead to the need for blood products in certain situations, infection, damage to normal structures that can lead to long term problems of pain or dysfunction, wound healing complications, the possibility of nonunion/malunion of any osteotomies and late or chronic pain as a result of the surgical intervention.  In addition potentially life threatening complications that can occur at the time of surgery and after surgery were discussed including but not limited to deep vein thrombosis, pulmonary embolism, myocardial infarction, stroke and death.              Electronically signed by Scar Garcia PA-C on 11/25/2024 at 11:48 AM

## 2024-11-26 PROCEDURE — 29880 ARTHRS KNE SRG MNISECTMY M&L: CPT | Performed by: STUDENT IN AN ORGANIZED HEALTH CARE EDUCATION/TRAINING PROGRAM

## 2024-12-09 ENCOUNTER — OFFICE VISIT (OUTPATIENT)
Dept: ORTHOPEDIC SURGERY | Facility: CLINIC | Age: 54
End: 2024-12-09
Payer: COMMERCIAL

## 2024-12-09 DIAGNOSIS — Z87.828 STATUS POST ARTHROSCOPIC PARTIAL LATERAL MENISCECTOMY OF RIGHT KNEE: ICD-10-CM

## 2024-12-09 DIAGNOSIS — Z87.828 STATUS POST ARTHROSCOPIC PARTIAL MEDIAL MENISCECTOMY OF RIGHT KNEE: Primary | ICD-10-CM

## 2024-12-09 DIAGNOSIS — Z98.890 STATUS POST ARTHROSCOPIC PARTIAL LATERAL MENISCECTOMY OF RIGHT KNEE: ICD-10-CM

## 2024-12-09 DIAGNOSIS — Z98.890 STATUS POST ARTHROSCOPIC PARTIAL MEDIAL MENISCECTOMY OF RIGHT KNEE: Primary | ICD-10-CM

## 2024-12-09 PROCEDURE — 99211 OFF/OP EST MAY X REQ PHY/QHP: CPT | Performed by: STUDENT IN AN ORGANIZED HEALTH CARE EDUCATION/TRAINING PROGRAM

## 2024-12-09 NOTE — LETTER
December 9, 2024     Patient: Marla Nash   YOB: 1970   Date of Visit: 12/9/2024       To Whom It May Concern:    It is my medical opinion that Marla Nash may return to full duty immediately with no restrictions.    If you have any questions or concerns, please don't hesitate to call.         Sincerely,        Checo Anthony MD    CC: No Recipients

## 2024-12-09 NOTE — PROGRESS NOTES
Chief Complaint   Patient presents with    Right Knee - Follow-up, Post-op     MMT and LMT 11/26/2024       History of Present Illness  Mimi is a 54-year-old female presenting today for first postop follow-up after right knee partial medial and lateral meniscectomy, patellofemoral chondroplasty 11/26/2024.  Patient returns today noting minimal pain.  Endorsing a minimal amount of swelling and associated stiffness however this is improving.  Denies any calf pain or shortness of breath.       Exam  Mild effusion  Healthy incisions - no active drainage  Good range of motion  No calf swelling  Negative Clyde´s test  Distal neurovascular exam intact     Assessment  Patient status post right knee arthroscopy partial medial and lateral meniscectomy, patellofemoral chondroplasty, 2 weeks out     Plan  Reviewed arthroscopic photos and findings at length.  Discussed short and long term implications for the knee.  Discussed analgesics, ice, rest.  Encouraged home exercise program, physical therapy.   XRays at follow up none    Arthroscopic findings of suprapatellar pouch synovitis, grade 3 changes of the patella, grade 4 Outerbridge change of the trochlea, grade 4 Outerbridge change of the medial femoral condyle, grade 3 Outerbridge change of the lateral tibial plateau, complex tear of the medial meniscus, white zone tear of the lateral meniscus all discussed with patient in detail today

## 2025-01-13 ENCOUNTER — OFFICE VISIT (OUTPATIENT)
Dept: ORTHOPEDIC SURGERY | Facility: CLINIC | Age: 55
End: 2025-01-13
Payer: COMMERCIAL

## 2025-01-13 DIAGNOSIS — Z98.890 STATUS POST ARTHROSCOPY OF RIGHT KNEE: Primary | ICD-10-CM

## 2025-01-13 PROCEDURE — 99211 OFF/OP EST MAY X REQ PHY/QHP: CPT | Performed by: STUDENT IN AN ORGANIZED HEALTH CARE EDUCATION/TRAINING PROGRAM

## 2025-01-13 NOTE — PROGRESS NOTES
Chief Complaint   Patient presents with    Right Knee - Follow-up, Post-op     MMT and LMT 11/26/2024       History of Present Illness  Mimi is a 54-year-old female presenting today for postop follow-up after right knee partial medial and lateral meniscectomy, patellofemoral chondroplasty 11/26/2024.  Patient returns today noting minimal pain.  Endorsing a minimal amount of swelling and associated stiffness however this is improving.  Denies any calf pain or shortness of breath.       Exam  Mild effusion  Healthy incisions - no active drainage  Good range of motion 0-100  No calf swelling  Negative Clyde´s test  Distal neurovascular exam intact     Assessment  Patient status post right knee arthroscopy partial medial and lateral meniscectomy, patellofemoral chondroplasty, 6 weeks out     Plan  Discussed short and long term implications for the knee.  Discussed analgesics, ice, rest over-the-counter anti-inflammatories as needed  Encouraged home exercise program, physical therapy.   Discussed activities to avoid as well as importance of using pain as a guide  Follow-up: 6 weeks  XRays at follow up none    Scar Garcia PA-C

## 2025-02-26 ENCOUNTER — APPOINTMENT (OUTPATIENT)
Dept: ORTHOPEDIC SURGERY | Facility: CLINIC | Age: 55
End: 2025-02-26
Payer: COMMERCIAL

## 2025-03-14 ENCOUNTER — HOSPITAL ENCOUNTER (OUTPATIENT)
Dept: RADIOLOGY | Facility: CLINIC | Age: 55
Discharge: HOME | End: 2025-03-14
Payer: COMMERCIAL

## 2025-03-14 ENCOUNTER — OFFICE VISIT (OUTPATIENT)
Dept: ORTHOPEDIC SURGERY | Facility: CLINIC | Age: 55
End: 2025-03-14
Payer: COMMERCIAL

## 2025-03-14 DIAGNOSIS — M54.2 NECK PAIN: Primary | ICD-10-CM

## 2025-03-14 DIAGNOSIS — M54.2 NECK PAIN: ICD-10-CM

## 2025-03-14 PROCEDURE — 72040 X-RAY EXAM NECK SPINE 2-3 VW: CPT

## 2025-03-14 PROCEDURE — 99214 OFFICE O/P EST MOD 30 MIN: CPT | Performed by: ORTHOPAEDIC SURGERY

## 2025-03-14 NOTE — PROGRESS NOTES
Marla Nash is a 54 y.o. female who presents for Follow-up of the Neck (3/1/24 C4-5, C5-6 ACDF/1 year out/X-rays today).    HPI:  54-year-old female here for surgical follow-up.  She is 1 year out from a C4-5 C5-6 ACDF.  She denies any fever chills nausea vomiting night sweats.  She has no bowel or bladder complaints.    Physical exam:  Well-nourished, well kept.  No lymphangitis or lymphadenopathy in the examined extremities. Affect normal.  Alert and oriented X 3.  Coordination normal.  Patient can rise from a seated position, can sit from a standing position. Can stand on heels and toes.  Patient is non-tender in the paraspinal musculature of the cervical spine. range of motion is intact. examination of the upper extremities reveals no point tenderness, swelling, or deformity.  Range of motion of the shoulders, elbows, wrists, and fingers are full without crepitance, instability, or exacerbation of pain. Strength is 5/5 throughout. no redness, abrasions, or lesions on the upper extremities bilaterally.  Gross sensation intact to the extremities.  Deep tendon reflexes 1+ and symmetric bilaterally.  Monroe negative.     Imaging studies:  AP lateral plain films of the cervical spine were ordered and reviewed today.    Assessment:  54-year-old female here for surgical follow-up.  She is 1 year out from a C4-5 C5-6 ACDF.  She is doing very well today at least 90% better.  She still does get some occasional neck pain posteriorly but the pain in the arm and shoulder on her right that she was having is pretty much gone.  She is happy with the outcome.  Her x-rays look good today.  It looks like she is fused in both of those levels.  We had this patient follow-up with Dr. Checo Anthony for right knee issues, he has been managing that with a right meniscal repair.    We have ordered and reviewed tests, x-rays.  We have reviewed the notes from Scar Garcia physician assistant with Dr. Anthony regarding her recovery from  her right meniscal repair.  This is a patient with 2 chronic stable problems, neck pain, upper extremity radicular symptoms.    For complete plan and/or surgical details, please refer to Dr. Koo's portion of this split dictation.    -Vaibhav Cross PA-C    In a face-to-face encounter, I performed a history and physical examination, discussed pertinent diagnostic studies if indicated, and discussed diagnosis and management strategies with both the patient and the midlevel provider.  I reviewed the midlevel's note and agree with the documented findings and plan of care.    Patient almost 90% better compared before surgery.  She is a year out from a two-level ACDF.  She has occasional numbness in both of her hands.  I am wondering if that is carpal tunnel.  She also has a ganglion cyst in the left wrist.  We are going to get her into my hand team for evaluation of bilateral carpal tunnel as well as her left wrist ganglion cyst.  X-rays were ordered and viewed today of her neck and she  has fused and solidly at both levels.  Hardware is in good position.  She has nice cervical alignment with no adjacent level issues.  We will let her engage in activities as tolerated.  Most recent hemoglobin A1c is 5.7 showing an improvement of her blood sugar control.  She understands the importance of that to help with her neurologic issues.  She has 2 chronic stable problems of bilateral hand numbness and tingling and a left ganglion cyst.    Hugh oKo MD  Orthopedic surgery

## 2025-03-24 ENCOUNTER — OFFICE VISIT (OUTPATIENT)
Dept: ORTHOPEDIC SURGERY | Facility: CLINIC | Age: 55
End: 2025-03-24
Payer: COMMERCIAL

## 2025-03-24 ENCOUNTER — HOSPITAL ENCOUNTER (OUTPATIENT)
Dept: RADIOLOGY | Facility: CLINIC | Age: 55
Discharge: HOME | End: 2025-03-24
Payer: COMMERCIAL

## 2025-03-24 DIAGNOSIS — G56.03 BILATERAL CARPAL TUNNEL SYNDROME: Primary | ICD-10-CM

## 2025-03-24 DIAGNOSIS — M25.532 LEFT WRIST PAIN: ICD-10-CM

## 2025-03-24 PROCEDURE — 2500000004 HC RX 250 GENERAL PHARMACY W/ HCPCS (ALT 636 FOR OP/ED): Performed by: STUDENT IN AN ORGANIZED HEALTH CARE EDUCATION/TRAINING PROGRAM

## 2025-03-24 PROCEDURE — 20526 THER INJECTION CARP TUNNEL: CPT | Mod: LT | Performed by: STUDENT IN AN ORGANIZED HEALTH CARE EDUCATION/TRAINING PROGRAM

## 2025-03-24 PROCEDURE — 73110 X-RAY EXAM OF WRIST: CPT | Mod: LEFT SIDE | Performed by: STUDENT IN AN ORGANIZED HEALTH CARE EDUCATION/TRAINING PROGRAM

## 2025-03-24 PROCEDURE — 99214 OFFICE O/P EST MOD 30 MIN: CPT | Performed by: STUDENT IN AN ORGANIZED HEALTH CARE EDUCATION/TRAINING PROGRAM

## 2025-03-24 PROCEDURE — 99214 OFFICE O/P EST MOD 30 MIN: CPT | Mod: 25 | Performed by: STUDENT IN AN ORGANIZED HEALTH CARE EDUCATION/TRAINING PROGRAM

## 2025-03-24 PROCEDURE — 73110 X-RAY EXAM OF WRIST: CPT | Mod: LT

## 2025-03-24 PROCEDURE — 20526 THER INJECTION CARP TUNNEL: CPT | Mod: RT | Performed by: STUDENT IN AN ORGANIZED HEALTH CARE EDUCATION/TRAINING PROGRAM

## 2025-03-24 RX ORDER — LIDOCAINE HYDROCHLORIDE 10 MG/ML
0.5 INJECTION, SOLUTION INFILTRATION; PERINEURAL
Status: COMPLETED | OUTPATIENT
Start: 2025-03-24 | End: 2025-03-24

## 2025-03-24 RX ORDER — LIDOCAINE HYDROCHLORIDE 10 MG/ML
1 INJECTION, SOLUTION INFILTRATION; PERINEURAL
Status: COMPLETED | OUTPATIENT
Start: 2025-03-24 | End: 2025-03-24

## 2025-03-24 RX ADMIN — LIDOCAINE HYDROCHLORIDE 0.5 ML: 10 INJECTION, SOLUTION INFILTRATION; PERINEURAL at 16:45

## 2025-03-24 RX ADMIN — TRIAMCINOLONE ACETONIDE 10 MG: 10 INJECTION, SUSPENSION INTRA-ARTICULAR; INTRALESIONAL at 16:45

## 2025-03-24 RX ADMIN — LIDOCAINE HYDROCHLORIDE 1 ML: 10 INJECTION, SOLUTION INFILTRATION; PERINEURAL at 16:45

## 2025-03-24 NOTE — PROGRESS NOTES
History of Present Illness:  Presents with  bilateral R>L  hand numbness. The symptoms have been present for months. The patient denies any inciting trauma. The numbness primarily involves the thumb, index finger, and long finger. There is minimal numbness in the small finger. The patient complains of intermittant, moderate hand pain which is worse at night. It causes frequent night awakenings. The patient presents due to worsening symptoms.  They have tried nighttime bracing without improvement in sxs.       Review of Systems   GENERAL: Negative for malaise, significant weight loss, fever  MUSCULOSKELETAL: see HPI  NEURO:  Negative    The patient's past medical history, family history, social history, and review of systems were reviewed. History is otherwise negative except as stated in the HPI.    Physical Examination:  General: Alert and oriented to person, place, and time.  No acute distress and breathing comfortably: Pleasant and cooperative with examination.  HEENT: Head is normocephalic and atraumatic.  Neck: Supple, no visible swelling.  Cardiovascular: No palpable tachycardia  Lungs: No audible wheezing or labored breathing  Abdomen: Nondistended.  On musculoskeletal examination, the patient has full elbow range of motion. There is no tenderness to palpation about the lateral epicondyle. Tinels at the cubital tunnel and elbow flexion/compression are negative for ulnar nerve symptoms. In regards to the wrist, there is no obvious deformity. Range of motion is full in flexion, extension, pronation, and supination. Strength is 5/5 in flexion and extension. There is no tenderness to palpation about the 1st dorsal compartment, the 1st CMC joint, or the TFCC. Tinels at the carpal tunnel and Durkins compression test are positive. The patient has subjective paresthesias in the median nerve distribution. Sensation and motor function are intact in the radial, and ulnar nerve distribution. There is no obvious thenar  atrophy, and thenar strength is 5/5. There is no intrinsic atrophy, and intrinsic strength is 5/5. All fingers are without triggering and are without pain over the A1 pulley. The patient can make a full composite fist. The hand itself is warm and well perfused. The skin is intact throughout. The contralateral hand/wrist are normal to inspection, range of motion, stability, and strength.    Imaging:  Left wrist without acute osseous process    Hand / UE Inj/Asp: R carpal tunnel for carpal tunnel syndrome on 3/24/2025 4:45 PM  Indications: pain and diagnostic  Details: 24 G needle, volar approach  Medications: 10 mg triamcinolone acetonide 10 mg/mL; 0.5 mL lidocaine 10 mg/mL (1 %)  Outcome: tolerated well, no immediate complications  Procedure, treatment alternatives, risks and benefits explained, specific risks discussed. Immediately prior to procedure a time out was called to verify the correct patient, procedure, equipment, support staff and site/side marked as required. Patient was prepped and draped in the usual sterile fashion.       Hand / UE Inj/Asp: L carpal tunnel for carpal tunnel syndrome on 3/24/2025 4:45 PM  Indications: pain and diagnostic  Details: 24 G needle, volar approach  Medications: 10 mg triamcinolone acetonide 10 mg/mL; 1 mL lidocaine 10 mg/mL (1 %)  Outcome: tolerated well, no immediate complications  Procedure, treatment alternatives, risks and benefits explained, specific risks discussed. Immediately prior to procedure a time out was called to verify the correct patient, procedure, equipment, support staff and site/side marked as required. Patient was prepped and draped in the usual sterile fashion.             Assessment:  Patient with R>L carpal tunnel syndrome.     Plan:   Injection. I believe that the patient may be symptomatic from carpal tunnel syndrome. In order to determine the proportion of symptoms that are attributable to CTS, I discussed giving a corticosteroid injection. I  explained the risks and benefits of an injection. Specifically, I reviewed the risks of injection, which include, but are not limited to, infection, bleeding, nerve injury, pain, steroid flare, glycemic alteration, subcutaneous fat atrophy, skin hypopigmentation, soft tissue damage, and incomplete symptom relief. At this time, the patient would like to proceed with an injection. After obtaining consent, I injected a 1mL combination of Kenalog and 1% lidocaine into the carpal tunnel using standard, sterile technique. The injection site was dressed, and the patient tolerated the injection well. I am hopeful that this injection will serve diagnostic, prognostic, and therapeutic purposes. Finally, I have emphasized patience, as any benefit may take several weeks to manifest. Depending on the success of the injection, I will see them back on an as needed basis.      Capri Anthony MD  Orthopaedic Surgeon

## 2025-04-21 ENCOUNTER — OFFICE VISIT (OUTPATIENT)
Dept: ORTHOPEDIC SURGERY | Facility: CLINIC | Age: 55
End: 2025-04-21
Payer: COMMERCIAL

## 2025-04-21 DIAGNOSIS — M25.532 LEFT WRIST PAIN: Primary | ICD-10-CM

## 2025-04-21 PROCEDURE — 99213 OFFICE O/P EST LOW 20 MIN: CPT | Performed by: STUDENT IN AN ORGANIZED HEALTH CARE EDUCATION/TRAINING PROGRAM

## 2025-04-21 NOTE — PROGRESS NOTES
History of Present Illness  Patient returns today for evaluation of right carpal tunnel syndrome and left wrist ganglion cyst.  Patient states she received some relief from cortisone injection to right carpal tunnel and symptoms have started to return over the last week.  Her left ganglion cyst is stable and not causing her any pain at this time.      Physical Examination:  General: Alert and oriented to person, place, and time.  No acute distress and breathing comfortably: Pleasant and cooperative with examination.  HEENT: Head is normocephalic and atraumatic.  Neck: Supple, no visible swelling.  Cardiovascular: No palpable tachycardia  Lungs: No audible wheezing or labored breathing  Abdomen: Nondistended.  On musculoskeletal examination, Tinel, Phalen, Durkan positive at right carpal tunnel.  There is no symptoms at the cubital tunnel.  Full range of motion about the right wrist.  There is obvious volar ganglion cyst present on the left wrist overlying the radiocarpal joint.  This is not tender to palpation.  She has full range of motion of the left wrist.    Sensation and motor function are intact in the radial, and median nerve distribution.  The hand itself is warm and well perfused. The skin is intact throughout. The contralateral hand/wrist are normal to inspection, range of motion, stability, and strength.    Radiographs  None    Assessment:  Patient with right carpal tunnel syndrome, left wrist volar ganglion cyst.  Patient had mild relief of symptoms with cortisone injection to right carpal tunnel.  She is unable to schedule surgery soon due to work obligations.  We will see her back later this year for surgical planning versus repeat injection.    Plan:   Discussed with patient she will schedule follow-up within 3 months of planned surgery.  Continue weightbearing as tolerated, use neutral splinting at night for right wrist.  Pain controlled on anti-inflammatories.    Follow-up within 3 months of  anticipated surgical date for surgical planning    Capri Anthony MD

## (undated) DEVICE — DRAPE, TIBURON, SPLIT SHEET, REINF ADH STRIP, 77X122

## (undated) DEVICE — RESERVOIR, DRAINAGE, WOUND, JACKSON-PRATT, 100 CC, SILICONE

## (undated) DEVICE — TRAY, MINOR, SINGLE BASIN, STERILE

## (undated) DEVICE — Device

## (undated) DEVICE — SPONGE, DISSECTOR, PEANUT, 3/8, STERILE 5 FOAM HOLDER"

## (undated) DEVICE — SUTURE, VICRYL, 2-0, 27 IN, BR/SH 27, VIOLET

## (undated) DEVICE — DRILL, NEURO, PRECISION, 3MM X 3.8MM, CARBIDE

## (undated) DEVICE — TAPE, SILK, DURAPORE, 3 IN X 10 YD, LF

## (undated) DEVICE — NEEDLE, SPINAL, QUINCKE, 18 G X 3.5 IN, PINK HUB

## (undated) DEVICE — TUBE, FRAZIER SUCTION, 12 FR.

## (undated) DEVICE — APPLICATOR, CHLORAPREP, W/ORANGE TINT, 26ML

## (undated) DEVICE — BUR, ROUND 5MM OSTEON, ELITE, SOFT TOUCH

## (undated) DEVICE — GOWN, SURGICAL, ROYAL SILK, LG, STERILE

## (undated) DEVICE — PROTECTOR, NERVE, ULNAR, PINK

## (undated) DEVICE — SOLUTION, INJECTION, USP, DEXTROSE 5%, LACTATED RINGERS, 1000 ML, BAG

## (undated) DEVICE — SPONGE, NEURO, 3/4 X 3/4IN, STERILE, LF

## (undated) DEVICE — ADHESIVE, SKIN, MASTISOL, 2/3 CC VIAL

## (undated) DEVICE — ELECTRODE, ELECTROSURGICAL, BLADE EXT 4 INCH, INSULATED

## (undated) DEVICE — COVER, EQUIPMENT, C ARM, W/ STRAPS

## (undated) DEVICE — SHAVER, MICRO HOOK, WITH TUBESET

## (undated) DEVICE — SUTURE, VICRYL, 4-0, 18 IN, UNDYED BR PS-2

## (undated) DEVICE — STRAP, ARM BOARD, 32 X 1.5

## (undated) DEVICE — GLOVE, SURGICAL, PROTEXIS PI , 7.5, PF, LF

## (undated) DEVICE — REST, HEAD, BAGEL, 9 IN

## (undated) DEVICE — DRESSING, GAUZE, SPONGE, 8 PLY, CURITY, 4 X 4, LF

## (undated) DEVICE — SEALANT, HEMOSTATIC, FLOSEAL, 10 ML

## (undated) DEVICE — STRAP, VELCRO, BODY, 4 X 60IN, NS

## (undated) DEVICE — CATHETER, CATHLON IV, 14GA, NON- RADIOPAQUE, DISP

## (undated) DEVICE — GOWN, SURGICAL, ROYAL SILK, XL, STERILE

## (undated) DEVICE — DRAPE, SHEET, 17 X 23 IN

## (undated) DEVICE — SUTURE, VICRYL, 2-0, 18 IN, VIOLET

## (undated) DEVICE — DRAPE, SMARTDRAPE, FOR TIVATO MICROSCOPE

## (undated) DEVICE — TUBING SET, HIGH PRESSURE, 3M, DISP